# Patient Record
Sex: MALE | Race: WHITE | Employment: FULL TIME | URBAN - METROPOLITAN AREA
[De-identification: names, ages, dates, MRNs, and addresses within clinical notes are randomized per-mention and may not be internally consistent; named-entity substitution may affect disease eponyms.]

---

## 2018-01-15 ENCOUNTER — TELEPHONE (OUTPATIENT)
Dept: FAMILY MEDICINE | Facility: CLINIC | Age: 28
End: 2018-01-15

## 2020-01-06 ENCOUNTER — HOSPITAL ENCOUNTER (EMERGENCY)
Facility: CLINIC | Age: 30
Discharge: ANOTHER HOSPITAL | End: 2020-01-07
Attending: FAMILY MEDICINE | Admitting: FAMILY MEDICINE
Payer: COMMERCIAL

## 2020-01-06 ENCOUNTER — APPOINTMENT (OUTPATIENT)
Dept: NUCLEAR MEDICINE | Facility: CLINIC | Age: 30
End: 2020-01-06
Attending: FAMILY MEDICINE
Payer: COMMERCIAL

## 2020-01-06 ENCOUNTER — APPOINTMENT (OUTPATIENT)
Dept: CT IMAGING | Facility: CLINIC | Age: 30
End: 2020-01-06
Attending: FAMILY MEDICINE
Payer: COMMERCIAL

## 2020-01-06 DIAGNOSIS — G89.18 ACUTE POST-OPERATIVE PAIN: ICD-10-CM

## 2020-01-06 DIAGNOSIS — Z90.49 STATUS POST CHOLECYSTECTOMY: ICD-10-CM

## 2020-01-06 DIAGNOSIS — K83.8 BILE DUCT LEAK: ICD-10-CM

## 2020-01-06 LAB
ALBUMIN SERPL-MCNC: 4.5 G/DL (ref 3.4–5)
ALP SERPL-CCNC: 58 U/L (ref 40–150)
ALT SERPL W P-5'-P-CCNC: 42 U/L (ref 0–70)
ANION GAP SERPL CALCULATED.3IONS-SCNC: 5 MMOL/L (ref 3–14)
AST SERPL W P-5'-P-CCNC: 23 U/L (ref 0–45)
BASOPHILS # BLD AUTO: 0.1 10E9/L (ref 0–0.2)
BASOPHILS NFR BLD AUTO: 0.5 %
BILIRUB SERPL-MCNC: 0.6 MG/DL (ref 0.2–1.3)
BUN SERPL-MCNC: 14 MG/DL (ref 7–30)
CALCIUM SERPL-MCNC: 9.7 MG/DL (ref 8.5–10.1)
CHLORIDE SERPL-SCNC: 104 MMOL/L (ref 94–109)
CO2 SERPL-SCNC: 29 MMOL/L (ref 20–32)
CREAT SERPL-MCNC: 0.76 MG/DL (ref 0.66–1.25)
DIFFERENTIAL METHOD BLD: ABNORMAL
EOSINOPHIL # BLD AUTO: 0.1 10E9/L (ref 0–0.7)
EOSINOPHIL NFR BLD AUTO: 0.5 %
ERYTHROCYTE [DISTWIDTH] IN BLOOD BY AUTOMATED COUNT: 11.9 % (ref 10–15)
GFR SERPL CREATININE-BSD FRML MDRD: >90 ML/MIN/{1.73_M2}
GLUCOSE SERPL-MCNC: 98 MG/DL (ref 70–99)
HCT VFR BLD AUTO: 47 % (ref 40–53)
HGB BLD-MCNC: 16 G/DL (ref 13.3–17.7)
IMM GRANULOCYTES # BLD: 0.1 10E9/L (ref 0–0.4)
IMM GRANULOCYTES NFR BLD: 0.6 %
LACTATE BLD-SCNC: 0.8 MMOL/L (ref 0.7–2)
LIPASE SERPL-CCNC: 65 U/L (ref 73–393)
LYMPHOCYTES # BLD AUTO: 1.3 10E9/L (ref 0.8–5.3)
LYMPHOCYTES NFR BLD AUTO: 8.5 %
MCH RBC QN AUTO: 31.4 PG (ref 26.5–33)
MCHC RBC AUTO-ENTMCNC: 34 G/DL (ref 31.5–36.5)
MCV RBC AUTO: 92 FL (ref 78–100)
MONOCYTES # BLD AUTO: 0.7 10E9/L (ref 0–1.3)
MONOCYTES NFR BLD AUTO: 4.9 %
NEUTROPHILS # BLD AUTO: 12.6 10E9/L (ref 1.6–8.3)
NEUTROPHILS NFR BLD AUTO: 85 %
NRBC # BLD AUTO: 0 10*3/UL
NRBC BLD AUTO-RTO: 0 /100
PLATELET # BLD AUTO: 322 10E9/L (ref 150–450)
POTASSIUM SERPL-SCNC: 3.7 MMOL/L (ref 3.4–5.3)
PROT SERPL-MCNC: 8.4 G/DL (ref 6.8–8.8)
RBC # BLD AUTO: 5.1 10E12/L (ref 4.4–5.9)
SODIUM SERPL-SCNC: 138 MMOL/L (ref 133–144)
WBC # BLD AUTO: 14.9 10E9/L (ref 4–11)

## 2020-01-06 PROCEDURE — 78226 HEPATOBILIARY SYSTEM IMAGING: CPT

## 2020-01-06 PROCEDURE — 25000125 ZZHC RX 250: Performed by: FAMILY MEDICINE

## 2020-01-06 PROCEDURE — C9113 INJ PANTOPRAZOLE SODIUM, VIA: HCPCS | Performed by: FAMILY MEDICINE

## 2020-01-06 PROCEDURE — 74177 CT ABD & PELVIS W/CONTRAST: CPT

## 2020-01-06 PROCEDURE — 96365 THER/PROPH/DIAG IV INF INIT: CPT | Mod: 59 | Performed by: FAMILY MEDICINE

## 2020-01-06 PROCEDURE — 96376 TX/PRO/DX INJ SAME DRUG ADON: CPT | Performed by: FAMILY MEDICINE

## 2020-01-06 PROCEDURE — 25000128 H RX IP 250 OP 636: Performed by: FAMILY MEDICINE

## 2020-01-06 PROCEDURE — 86376 MICROSOMAL ANTIBODY EACH: CPT | Performed by: FAMILY MEDICINE

## 2020-01-06 PROCEDURE — 83690 ASSAY OF LIPASE: CPT | Performed by: FAMILY MEDICINE

## 2020-01-06 PROCEDURE — 96375 TX/PRO/DX INJ NEW DRUG ADDON: CPT | Performed by: FAMILY MEDICINE

## 2020-01-06 PROCEDURE — 25000132 ZZH RX MED GY IP 250 OP 250 PS 637: Performed by: FAMILY MEDICINE

## 2020-01-06 PROCEDURE — 99285 EMERGENCY DEPT VISIT HI MDM: CPT | Mod: Z6 | Performed by: FAMILY MEDICINE

## 2020-01-06 PROCEDURE — 83605 ASSAY OF LACTIC ACID: CPT | Performed by: FAMILY MEDICINE

## 2020-01-06 PROCEDURE — 99285 EMERGENCY DEPT VISIT HI MDM: CPT | Mod: 25 | Performed by: FAMILY MEDICINE

## 2020-01-06 PROCEDURE — 34300033 ZZH RX 343: Performed by: FAMILY MEDICINE

## 2020-01-06 PROCEDURE — 25800030 ZZH RX IP 258 OP 636: Performed by: FAMILY MEDICINE

## 2020-01-06 PROCEDURE — 85025 COMPLETE CBC W/AUTO DIFF WBC: CPT | Performed by: FAMILY MEDICINE

## 2020-01-06 PROCEDURE — A9537 TC99M MEBROFENIN: HCPCS | Performed by: FAMILY MEDICINE

## 2020-01-06 PROCEDURE — 25000128 H RX IP 250 OP 636

## 2020-01-06 PROCEDURE — 80053 COMPREHEN METABOLIC PANEL: CPT | Performed by: FAMILY MEDICINE

## 2020-01-06 PROCEDURE — 93005 ELECTROCARDIOGRAM TRACING: CPT | Performed by: FAMILY MEDICINE

## 2020-01-06 RX ORDER — SODIUM CHLORIDE 9 MG/ML
1000 INJECTION, SOLUTION INTRAVENOUS CONTINUOUS
Status: DISCONTINUED | OUTPATIENT
Start: 2020-01-06 | End: 2020-01-07 | Stop reason: HOSPADM

## 2020-01-06 RX ORDER — IOPAMIDOL 755 MG/ML
68 INJECTION, SOLUTION INTRAVASCULAR ONCE
Status: COMPLETED | OUTPATIENT
Start: 2020-01-06 | End: 2020-01-06

## 2020-01-06 RX ORDER — ONDANSETRON 2 MG/ML
4 INJECTION INTRAMUSCULAR; INTRAVENOUS ONCE
Status: COMPLETED | OUTPATIENT
Start: 2020-01-06 | End: 2020-01-06

## 2020-01-06 RX ORDER — SODIUM CHLORIDE, SODIUM LACTATE, POTASSIUM CHLORIDE, CALCIUM CHLORIDE 600; 310; 30; 20 MG/100ML; MG/100ML; MG/100ML; MG/100ML
1000 INJECTION, SOLUTION INTRAVENOUS CONTINUOUS
Status: DISCONTINUED | OUTPATIENT
Start: 2020-01-06 | End: 2020-01-07 | Stop reason: HOSPADM

## 2020-01-06 RX ORDER — ERTAPENEM 1 G/1
1 INJECTION, POWDER, LYOPHILIZED, FOR SOLUTION INTRAMUSCULAR; INTRAVENOUS EVERY 24 HOURS
Status: DISCONTINUED | OUTPATIENT
Start: 2020-01-06 | End: 2020-01-07 | Stop reason: HOSPADM

## 2020-01-06 RX ORDER — LEVOTHYROXINE SODIUM 88 UG/1
88 TABLET ORAL DAILY
Qty: 60 TABLET | Refills: 0 | Status: SHIPPED | OUTPATIENT
Start: 2020-01-06 | End: 2020-01-06

## 2020-01-06 RX ORDER — MORPHINE SULFATE 4 MG/ML
6 INJECTION, SOLUTION INTRAMUSCULAR; INTRAVENOUS ONCE
Status: COMPLETED | OUTPATIENT
Start: 2020-01-06 | End: 2020-01-06

## 2020-01-06 RX ORDER — OXYCODONE AND ACETAMINOPHEN 5; 325 MG/1; MG/1
1-2 TABLET ORAL EVERY 6 HOURS PRN
COMMUNITY
Start: 2019-12-31

## 2020-01-06 RX ORDER — FENTANYL CITRATE 50 UG/ML
50-100 INJECTION, SOLUTION INTRAMUSCULAR; INTRAVENOUS ONCE
Status: COMPLETED | OUTPATIENT
Start: 2020-01-06 | End: 2020-01-06

## 2020-01-06 RX ORDER — METOCLOPRAMIDE HYDROCHLORIDE 5 MG/ML
10 INJECTION INTRAMUSCULAR; INTRAVENOUS ONCE
Status: COMPLETED | OUTPATIENT
Start: 2020-01-06 | End: 2020-01-06

## 2020-01-06 RX ORDER — SUCRALFATE ORAL 1 G/10ML
1 SUSPENSION ORAL ONCE
Status: COMPLETED | OUTPATIENT
Start: 2020-01-06 | End: 2020-01-06

## 2020-01-06 RX ORDER — ESCITALOPRAM OXALATE 10 MG/1
10 TABLET ORAL DAILY
COMMUNITY
Start: 2019-12-06

## 2020-01-06 RX ORDER — KIT FOR THE PREPARATION OF TECHNETIUM TC 99M MEBROFENIN 45 MG/10ML
5.5 INJECTION, POWDER, LYOPHILIZED, FOR SOLUTION INTRAVENOUS ONCE
Status: COMPLETED | OUTPATIENT
Start: 2020-01-06 | End: 2020-01-06

## 2020-01-06 RX ORDER — HYDROMORPHONE HYDROCHLORIDE 1 MG/ML
0.5 INJECTION, SOLUTION INTRAMUSCULAR; INTRAVENOUS; SUBCUTANEOUS
Status: DISCONTINUED | OUTPATIENT
Start: 2020-01-06 | End: 2020-01-07 | Stop reason: HOSPADM

## 2020-01-06 RX ORDER — FENTANYL CITRATE 50 UG/ML
INJECTION, SOLUTION INTRAMUSCULAR; INTRAVENOUS
Status: COMPLETED
Start: 2020-01-06 | End: 2020-01-06

## 2020-01-06 RX ORDER — KETOROLAC TROMETHAMINE 15 MG/ML
15 INJECTION, SOLUTION INTRAMUSCULAR; INTRAVENOUS ONCE
Status: COMPLETED | OUTPATIENT
Start: 2020-01-06 | End: 2020-01-06

## 2020-01-06 RX ORDER — MORPHINE SULFATE 2 MG/ML
4 INJECTION, SOLUTION INTRAMUSCULAR; INTRAVENOUS
Status: DISCONTINUED | OUTPATIENT
Start: 2020-01-06 | End: 2020-01-07 | Stop reason: HOSPADM

## 2020-01-06 RX ORDER — FENTANYL CITRATE 50 UG/ML
50 INJECTION, SOLUTION INTRAMUSCULAR; INTRAVENOUS EVERY 30 MIN PRN
Status: DISCONTINUED | OUTPATIENT
Start: 2020-01-06 | End: 2020-01-07 | Stop reason: HOSPADM

## 2020-01-06 RX ADMIN — FENTANYL CITRATE 50 MCG: 50 INJECTION, SOLUTION INTRAMUSCULAR; INTRAVENOUS at 22:10

## 2020-01-06 RX ADMIN — HYDROMORPHONE HYDROCHLORIDE 0.5 MG: 1 INJECTION, SOLUTION INTRAMUSCULAR; INTRAVENOUS; SUBCUTANEOUS at 14:43

## 2020-01-06 RX ADMIN — ONDANSETRON 4 MG: 2 INJECTION INTRAMUSCULAR; INTRAVENOUS at 15:08

## 2020-01-06 RX ADMIN — HYDROMORPHONE HYDROCHLORIDE 0.5 MG: 1 INJECTION, SOLUTION INTRAMUSCULAR; INTRAVENOUS; SUBCUTANEOUS at 15:08

## 2020-01-06 RX ADMIN — SODIUM CHLORIDE 57 ML: 9 INJECTION, SOLUTION INTRAVENOUS at 16:00

## 2020-01-06 RX ADMIN — FENTANYL CITRATE 50 MCG: 50 INJECTION, SOLUTION INTRAMUSCULAR; INTRAVENOUS at 20:22

## 2020-01-06 RX ADMIN — METOCLOPRAMIDE HYDROCHLORIDE 10 MG: 5 INJECTION INTRAMUSCULAR; INTRAVENOUS at 16:53

## 2020-01-06 RX ADMIN — FENTANYL CITRATE 50 MCG: 50 INJECTION, SOLUTION INTRAMUSCULAR; INTRAVENOUS at 19:34

## 2020-01-06 RX ADMIN — ONDANSETRON 4 MG: 2 INJECTION INTRAMUSCULAR; INTRAVENOUS at 14:43

## 2020-01-06 RX ADMIN — MORPHINE SULFATE 4 MG: 2 INJECTION, SOLUTION INTRAMUSCULAR; INTRAVENOUS at 16:53

## 2020-01-06 RX ADMIN — PANTOPRAZOLE SODIUM 40 MG: 40 INJECTION, POWDER, FOR SOLUTION INTRAVENOUS at 18:23

## 2020-01-06 RX ADMIN — SODIUM CHLORIDE, POTASSIUM CHLORIDE, SODIUM LACTATE AND CALCIUM CHLORIDE 1000 ML: 600; 310; 30; 20 INJECTION, SOLUTION INTRAVENOUS at 18:24

## 2020-01-06 RX ADMIN — IOPAMIDOL 68 ML: 755 INJECTION, SOLUTION INTRAVENOUS at 15:59

## 2020-01-06 RX ADMIN — MORPHINE SULFATE 6 MG: 4 INJECTION, SOLUTION INTRAMUSCULAR; INTRAVENOUS at 15:43

## 2020-01-06 RX ADMIN — KETOROLAC TROMETHAMINE 15 MG: 15 INJECTION, SOLUTION INTRAMUSCULAR; INTRAVENOUS at 18:23

## 2020-01-06 RX ADMIN — SUCRALFATE 1 G: 1 SUSPENSION ORAL at 18:23

## 2020-01-06 RX ADMIN — ERTAPENEM SODIUM 1 G: 1 INJECTION, POWDER, LYOPHILIZED, FOR SOLUTION INTRAMUSCULAR; INTRAVENOUS at 22:10

## 2020-01-06 RX ADMIN — FENTANYL CITRATE 50 MCG: 50 INJECTION, SOLUTION INTRAMUSCULAR; INTRAVENOUS at 23:31

## 2020-01-06 RX ADMIN — SODIUM CHLORIDE 1000 ML: 9 INJECTION, SOLUTION INTRAVENOUS at 14:42

## 2020-01-06 RX ADMIN — MEBROFENIN 5.5 MILLICURIE: 45 INJECTION, POWDER, LYOPHILIZED, FOR SOLUTION INTRAVENOUS at 18:53

## 2020-01-06 ASSESSMENT — ENCOUNTER SYMPTOMS
BLOOD IN STOOL: 0
COUGH: 0
SINUS PRESSURE: 0
HEADACHES: 0
PALPITATIONS: 0
CHILLS: 0
NAUSEA: 1
CONSTIPATION: 0
VOMITING: 1
WHEEZING: 0
SHORTNESS OF BREATH: 0
ABDOMINAL PAIN: 1
FREQUENCY: 0
DIARRHEA: 0
DIAPHORESIS: 0
DYSURIA: 0
FEVER: 0
SORE THROAT: 0

## 2020-01-06 ASSESSMENT — MIFFLIN-ST. JEOR: SCORE: 1617.17

## 2020-01-06 NOTE — ED TRIAGE NOTES
Pt here for evaluation of abdominal pain and lower chest pain. Started last night ~2030 while sitting watching TV. Pain is sharp intermittent, initially upper then lower abdomen. Denies any dietary, activity, or other lifestyle changes. Lap elva at [facility] 6 days ago. Doing well up until last night. Stab surgical sites clean, dry, intact, well healing. Nausea with vomiting of bile like emesis this AM. Chills, no fever per pt report.

## 2020-01-06 NOTE — ED PROVIDER NOTES
History     Chief Complaint   Patient presents with     Post-op Problem     3 episodes of chest and abdominal pain since last night, 6 days post cholecystectomy.      HPI  Yony Zimmer is a 29 year old male who presents after lap elva 6 days ago - perfomed in [place].  recovering well until last evening, onset of pain upper abdomen pain - severe last evening and this am radiating  into the  chest - pain last for 1-2 hours - sharp pain.   bilious emesis this morning x1 episodes.  last stool scant material.  yesterday, stools were normal.  chills also but no  fever    Allergies:  Allergies   Allergen Reactions     Amoxicillin        Problem List:    Patient Active Problem List    Diagnosis Date Noted     Tobacco abuse 08/24/2015     Priority: Medium     Anxiety 12/03/2013     Priority: Medium     CARDIOVASCULAR SCREENING; LDL GOAL LESS THAN 160 11/29/2013     Priority: Medium     Acne 03/12/2012     Priority: Medium        Past Medical History:    Past Medical History:   Diagnosis Date     Migraine        Past Surgical History:    Past Surgical History:   Procedure Laterality Date     NO HISTORY OF SURGERY         Family History:    Family History   Problem Relation Age of Onset     Arthritis Father         rhuematoid arthritis     Cancer Father         GI     Heart Disease Maternal Grandfather      Cancer Paternal Grandmother         lung cancer     Neurologic Disorder Paternal Grandfather         parkinsons     Anxiety Disorder Brother        Social History:  Marital Status:  Single [1]  Social History     Tobacco Use     Smoking status: Current Every Day Smoker     Packs/day: 0.25     Years: 7.00     Pack years: 1.75     Types: Cigarettes     Smokeless tobacco: Never Used   Substance Use Topics     Alcohol use: Yes     Comment: 1-2 x week,2-3drinks     Drug use: No        Medications:    hydrOXYzine (VISTARIL) 50 MG capsule  SUMAtriptan (IMITREX) 25 MG tablet  varenicline (CHANTIX STARTING MONTH VALERIA) 0.5  "MG X 11 & 1 MG X 42 tablet  varenicline (CHANTIX) 1 MG tablet         Review of Systems   Constitutional: Negative for chills, diaphoresis and fever.   HENT: Negative for ear pain, sinus pressure and sore throat.    Eyes: Negative for visual disturbance.   Respiratory: Negative for cough, shortness of breath and wheezing.    Cardiovascular: Negative for chest pain and palpitations.   Gastrointestinal: Positive for abdominal pain, nausea and vomiting. Negative for blood in stool, constipation and diarrhea.   Genitourinary: Negative for dysuria, frequency and urgency.   Skin: Negative for rash.   Neurological: Negative for headaches.   All other systems reviewed and are negative.        Physical Exam   BP: (!) 144/83  Heart Rate: 86  Temp: 98.1  F (36.7  C)  Height: 180.3 cm (5' 11\")  Weight: 63.5 kg (140 lb)  SpO2: 99 %      Physical Exam  Constitutional:       General: He is in acute distress.      Appearance: He is ill-appearing. He is not toxic-appearing.   HENT:      Nose: Nose normal.      Mouth/Throat:      Mouth: Mucous membranes are moist.   Eyes:      Conjunctiva/sclera: Conjunctivae normal.   Neck:      Musculoskeletal: Neck supple.   Cardiovascular:      Rate and Rhythm: Normal rate and regular rhythm.      Heart sounds: No murmur. No friction rub. No gallop.    Pulmonary:      Effort: Pulmonary effort is normal. No respiratory distress.      Breath sounds: No stridor. No wheezing or rhonchi.   Abdominal:      General: Abdomen is flat. Bowel sounds are normal. There is no distension.      Palpations: There is no mass.      Tenderness: There is abdominal tenderness in the right upper quadrant and epigastric area. There is guarding. There is no right CVA tenderness, left CVA tenderness or rebound.      Hernia: No hernia is present.   Musculoskeletal: Normal range of motion.      Right lower leg: No edema.      Left lower leg: No edema.   Skin:     Coloration: Skin is not jaundiced or pale.      Findings: No " rash.   Neurological:      General: No focal deficit present.      Mental Status: He is alert.         ED Course       Procedures               Critical Care time:  none               Results for orders placed or performed during the hospital encounter of 01/06/20 (from the past 24 hour(s))   CBC with platelets, differential   Result Value Ref Range    WBC 14.9 (H) 4.0 - 11.0 10e9/L    RBC Count 5.10 4.4 - 5.9 10e12/L    Hemoglobin 16.0 13.3 - 17.7 g/dL    Hematocrit 47.0 40.0 - 53.0 %    MCV 92 78 - 100 fl    MCH 31.4 26.5 - 33.0 pg    MCHC 34.0 31.5 - 36.5 g/dL    RDW 11.9 10.0 - 15.0 %    Platelet Count 322 150 - 450 10e9/L    Diff Method Automated Method     % Neutrophils 85.0 %    % Lymphocytes 8.5 %    % Monocytes 4.9 %    % Eosinophils 0.5 %    % Basophils 0.5 %    % Immature Granulocytes 0.6 %    Nucleated RBCs 0 0 /100    Absolute Neutrophil 12.6 (H) 1.6 - 8.3 10e9/L    Absolute Lymphocytes 1.3 0.8 - 5.3 10e9/L    Absolute Monocytes 0.7 0.0 - 1.3 10e9/L    Absolute Eosinophils 0.1 0.0 - 0.7 10e9/L    Absolute Basophils 0.1 0.0 - 0.2 10e9/L    Abs Immature Granulocytes 0.1 0 - 0.4 10e9/L    Absolute Nucleated RBC 0.0    Comprehensive metabolic panel   Result Value Ref Range    Sodium 138 133 - 144 mmol/L    Potassium 3.7 3.4 - 5.3 mmol/L    Chloride 104 94 - 109 mmol/L    Carbon Dioxide 29 20 - 32 mmol/L    Anion Gap 5 3 - 14 mmol/L    Glucose 98 70 - 99 mg/dL    Urea Nitrogen 14 7 - 30 mg/dL    Creatinine 0.76 0.66 - 1.25 mg/dL    GFR Estimate >90 >60 mL/min/[1.73_m2]    GFR Estimate If Black >90 >60 mL/min/[1.73_m2]    Calcium 9.7 8.5 - 10.1 mg/dL    Bilirubin Total 0.6 0.2 - 1.3 mg/dL    Albumin 4.5 3.4 - 5.0 g/dL    Protein Total 8.4 6.8 - 8.8 g/dL    Alkaline Phosphatase 58 40 - 150 U/L    ALT 42 0 - 70 U/L    AST 23 0 - 45 U/L   Lipase   Result Value Ref Range    Lipase 65 (L) 73 - 393 U/L   CT Abdomen Pelvis w Contrast    Narrative    CT ABDOMEN AND PELVIS WITH CONTRAST   1/6/2020 4:14 PM     HISTORY:   Post-op pain status post elva.    TECHNIQUE:  68 mL Isovue 370. CT images of the abdomen and pelvis  following nonionic intravenous contrast. Radiation dose for this scan  was reduced using automated exposure control, adjustment of the mA  and/or kV according to patient size, or iterative reconstruction  technique.    COMPARISON: None.    FINDINGS: Moderate-sized hiatal hernia. The liver and spleen are  unremarkable. The gallbladder has been removed. There is some  nonloculated fluid density in the gallbladder fossa associated with  trace fluid extending down to the pelvis.    The gallbladder, adrenal glands, and kidneys are unremarkable. No  abdominal or retroperitoneal lymphadenopathy. No free intraperitoneal  air. No pelvic lymphadenopathy. Urinary bladder is unremarkable. No  lytic or blastic bone lesions. Lung bases are unremarkable.      Impression    IMPRESSION:  1. There is a small amount of nonloculated fluid density in the  gallbladder fossa with some small amount of fluid extending into the  pelvis. This is likely a small amount of residual postoperative  serosanguineous fluid. A bile leak cannot be entirely excluded. If  there are physical findings or laboratory evidence suggestive of bile  leak, consider confirmation with hepatobiliary scan.  2. Moderate-sized hiatal hernia.  3. No evidence of intra-abdominal abscess.    SAMUEL SOTO MD   Lactic acid whole blood   Result Value Ref Range    Lactic Acid 0.8 0.7 - 2.0 mmol/L   NM HepatOBiliary Scan    Narrative    NUCLEAR MEDICINE HEPATOBILIARY SCAN 1/6/2020 7:46 PM    INDICATION: Postoperative cholecystectomy, evaluate for bile leak.     COMPARISON: CT abdomen pelvis on same day earlier.    TECHNIQUE: The patient received 5.5 mCi Tc-Mebrofenin intravenously.  Images were obtained out through 70 minutes.     FINDINGS: Small amount of radiotracer appear to track along the  anterior and posterior aspects of the liver on delayed images,  finding  suggestive of bile leak.      Impression    IMPRESSION: Small amount of radiotracer noted tracking along the  anterior and posterior aspects of the liver, findings suggestive of  bile leak.    LEO JOEL MD       Medications   0.9% sodium chloride BOLUS (0 mLs Intravenous Stopped 1/6/20 1745)   ondansetron (ZOFRAN) injection 4 mg (4 mg Intravenous Given 1/6/20 1443)   ondansetron (ZOFRAN) injection 4 mg (4 mg Intravenous Given 1/6/20 1508)   iopamidol (ISOVUE-370) solution 68 mL (68 mLs Intravenous Given 1/6/20 1559)   sodium chloride 0.9 % bag 500mL for CT scan flush use (57 mLs Intravenous Given 1/6/20 1600)   morphine (PF) injection 6 mg (6 mg Intravenous Given 1/6/20 1543)   metoclopramide (REGLAN) injection 10 mg (10 mg Intravenous Given 1/6/20 1653)   pantoprazole (PROTONIX) 40 mg IV push injection (40 mg Intravenous Given 1/6/20 1823)   sucralfate (CARAFATE) suspension 1 g (1 g Oral Given 1/6/20 1823)   lactated ringers BOLUS 1,000 mL ( Intravenous Restarted 1/6/20 2022)   ketorolac (TORADOL) injection 15 mg (15 mg Intravenous Given 1/6/20 1823)   technetium Tc 99m mebrofenin (CHOLETEC) radioisotope injection 5.5 millicurie (5.5 millicuries Intravenous Given 1/6/20 1853)   fentaNYL (PF) (SUBLIMAZE) injection  mcg (50 mcg Intravenous Given 1/6/20 2022)       Assessments & Plan (with Medical Decision Making)     MDM: Yony Zimmer is a 29 year old male who presents with epigastric and right upper quadrant pain that had onset 6 days after cholecystectomy performed at [facility] and has been recovering locally with his family in [place] since surgery.  This is been accompanied by an episode of vomiting as well as nausea but particularly of significantly increased pain in bouts of 2 hours of pain with some relief in between.  He has been using oral analgesics at home without benefit and presented after almost 24 hours of symptoms.    Here he is afebrile with normal blood  pressure heart rate, but he is in significant distress on his presentation and his abdomen is tender with guarding in the upper quadrants.  I discussed his findings with Dr. Cruz who recommended that if his bilirubin were elevated to go directly to HIDA scan but if normal to start with a CT of the abdomen and pelvis.  The CT was nondiagnostic and could not exclude biliary leak and therefore HIDA scan was obtained which did identify biliary leak.   Discussed with Dr. Fabian in [facility] and Dr. Palacios hospitalist and they accept the patient for transfer.  Discussed transfer with Ruddy and he agrees to transfer.      Of note is that the patient's symptoms were quite difficult to get under control with pain management in the emergency department.  He had increased pain with both Dilaudid and with morphine.  He ultimately had improved pain relief with fentanyl and that was continued.  His nausea was not completely controlled with Zofran and then following CT demonstrating no small bowel obstruction, with Reglan.    He was administered IV fluids and then after discussion with Dr. Fabian, ertapenem.      I did call lab on 1/7/2020 am to let them know I had accidentally placed a TPO thyroid antibody on this patient and asked them to delete the order.      I have reviewed the nursing notes.    I have reviewed the findings, diagnosis, plan and need for follow up with the patient.       New Prescriptions    No medications on file       Final diagnoses:   Bile duct leak   Acute post-operative pain   Status post cholecystectomy       1/6/2020   [facility] EMERGENCY DEPARTMENT     Clayton Allen MD  01/07/20 7450

## 2020-01-06 NOTE — ED NOTES
Pt reports worsening pain and nausea. Pt is diaphoretic and belching in room. Given additional medications per MD

## 2020-01-07 ENCOUNTER — APPOINTMENT (OUTPATIENT)
Dept: GENERAL RADIOLOGY | Facility: CLINIC | Age: 30
End: 2020-01-07
Attending: INTERNAL MEDICINE
Payer: COMMERCIAL

## 2020-01-07 ENCOUNTER — ANESTHESIA (OUTPATIENT)
Dept: SURGERY | Facility: CLINIC | Age: 30
End: 2020-01-07
Payer: COMMERCIAL

## 2020-01-07 ENCOUNTER — ANESTHESIA EVENT (OUTPATIENT)
Dept: SURGERY | Facility: CLINIC | Age: 30
End: 2020-01-07
Payer: COMMERCIAL

## 2020-01-07 ENCOUNTER — HOSPITAL ENCOUNTER (INPATIENT)
Facility: CLINIC | Age: 30
LOS: 2 days | Discharge: HOME | End: 2020-01-09
Attending: INTERNAL MEDICINE | Admitting: INTERNAL MEDICINE
Payer: COMMERCIAL

## 2020-01-07 VITALS
BODY MASS INDEX: 19.6 KG/M2 | HEART RATE: 91 BPM | WEIGHT: 140 LBS | SYSTOLIC BLOOD PRESSURE: 127 MMHG | DIASTOLIC BLOOD PRESSURE: 73 MMHG | RESPIRATION RATE: 13 BRPM | TEMPERATURE: 98.1 F | OXYGEN SATURATION: 100 % | HEIGHT: 71 IN

## 2020-01-07 DIAGNOSIS — K83.9 BILE LEAK: ICD-10-CM

## 2020-01-07 DIAGNOSIS — R10.84 GENERALIZED ABDOMINAL PAIN: ICD-10-CM

## 2020-01-07 DIAGNOSIS — R11.0 NAUSEA: ICD-10-CM

## 2020-01-07 DIAGNOSIS — K83.9 BILE LEAK: Primary | ICD-10-CM

## 2020-01-07 PROBLEM — R10.9 ABDOMINAL PAIN: Status: ACTIVE | Noted: 2020-01-07

## 2020-01-07 LAB
ALBUMIN SERPL-MCNC: 3.4 G/DL (ref 3.4–5)
ALP SERPL-CCNC: 48 U/L (ref 40–150)
ALT SERPL W P-5'-P-CCNC: 30 U/L (ref 0–70)
AMYLASE SERPL-CCNC: 64 U/L (ref 30–110)
ANION GAP SERPL CALCULATED.3IONS-SCNC: 4 MMOL/L (ref 3–14)
AST SERPL W P-5'-P-CCNC: 18 U/L (ref 0–45)
BASOPHILS # BLD AUTO: 0 10E9/L (ref 0–0.2)
BASOPHILS NFR BLD AUTO: 0.3 %
BILIRUB SERPL-MCNC: 1.1 MG/DL (ref 0.2–1.3)
BUN SERPL-MCNC: 13 MG/DL (ref 7–30)
CALCIUM SERPL-MCNC: 8.7 MG/DL (ref 8.5–10.1)
CHLORIDE SERPL-SCNC: 105 MMOL/L (ref 94–109)
CO2 SERPL-SCNC: 28 MMOL/L (ref 20–32)
CREAT SERPL-MCNC: 0.73 MG/DL (ref 0.66–1.25)
DIFFERENTIAL METHOD BLD: ABNORMAL
EOSINOPHIL # BLD AUTO: 0.1 10E9/L (ref 0–0.7)
EOSINOPHIL NFR BLD AUTO: 1 %
ERCP: NORMAL
ERYTHROCYTE [DISTWIDTH] IN BLOOD BY AUTOMATED COUNT: 11.9 % (ref 10–15)
ERYTHROCYTE [DISTWIDTH] IN BLOOD BY AUTOMATED COUNT: 12 % (ref 10–15)
GFR SERPL CREATININE-BSD FRML MDRD: >90 ML/MIN/{1.73_M2}
GLUCOSE BLDC GLUCOMTR-MCNC: 92 MG/DL (ref 70–99)
GLUCOSE SERPL-MCNC: 95 MG/DL (ref 70–99)
HCT VFR BLD AUTO: 38.7 % (ref 40–53)
HCT VFR BLD AUTO: 42 % (ref 40–53)
HGB BLD-MCNC: 12.8 G/DL (ref 13.3–17.7)
HGB BLD-MCNC: 13.8 G/DL (ref 13.3–17.7)
IMM GRANULOCYTES # BLD: 0.1 10E9/L (ref 0–0.4)
IMM GRANULOCYTES NFR BLD: 0.4 %
INR PPP: 1.05 (ref 0.86–1.14)
LIPASE SERPL-CCNC: 393 U/L (ref 73–393)
LYMPHOCYTES # BLD AUTO: 1.6 10E9/L (ref 0.8–5.3)
LYMPHOCYTES NFR BLD AUTO: 13 %
MCH RBC QN AUTO: 31.1 PG (ref 26.5–33)
MCH RBC QN AUTO: 31.1 PG (ref 26.5–33)
MCHC RBC AUTO-ENTMCNC: 32.9 G/DL (ref 31.5–36.5)
MCHC RBC AUTO-ENTMCNC: 33.1 G/DL (ref 31.5–36.5)
MCV RBC AUTO: 94 FL (ref 78–100)
MCV RBC AUTO: 95 FL (ref 78–100)
MONOCYTES # BLD AUTO: 1.2 10E9/L (ref 0–1.3)
MONOCYTES NFR BLD AUTO: 9.5 %
NEUTROPHILS # BLD AUTO: 9.4 10E9/L (ref 1.6–8.3)
NEUTROPHILS NFR BLD AUTO: 75.8 %
NRBC # BLD AUTO: 0 10*3/UL
NRBC BLD AUTO-RTO: 0 /100
PLATELET # BLD AUTO: 245 10E9/L (ref 150–450)
PLATELET # BLD AUTO: 249 10E9/L (ref 150–450)
POTASSIUM SERPL-SCNC: 3.7 MMOL/L (ref 3.4–5.3)
PROT SERPL-MCNC: 6.5 G/DL (ref 6.8–8.8)
RBC # BLD AUTO: 4.12 10E12/L (ref 4.4–5.9)
RBC # BLD AUTO: 4.44 10E12/L (ref 4.4–5.9)
SODIUM SERPL-SCNC: 137 MMOL/L (ref 133–144)
WBC # BLD AUTO: 12.4 10E9/L (ref 4–11)
WBC # BLD AUTO: 12.5 10E9/L (ref 4–11)

## 2020-01-07 PROCEDURE — 36415 COLL VENOUS BLD VENIPUNCTURE: CPT | Performed by: INTERNAL MEDICINE

## 2020-01-07 PROCEDURE — 25000125 ZZHC RX 250: Performed by: INTERNAL MEDICINE

## 2020-01-07 PROCEDURE — 82150 ASSAY OF AMYLASE: CPT | Performed by: INTERNAL MEDICINE

## 2020-01-07 PROCEDURE — 25000128 H RX IP 250 OP 636: Performed by: NURSE ANESTHETIST, CERTIFIED REGISTERED

## 2020-01-07 PROCEDURE — 85025 COMPLETE CBC W/AUTO DIFF WBC: CPT | Performed by: STUDENT IN AN ORGANIZED HEALTH CARE EDUCATION/TRAINING PROGRAM

## 2020-01-07 PROCEDURE — 36415 COLL VENOUS BLD VENIPUNCTURE: CPT | Performed by: STUDENT IN AN ORGANIZED HEALTH CARE EDUCATION/TRAINING PROGRAM

## 2020-01-07 PROCEDURE — 40000279 XR SURGERY CARM FLUORO GREATER THAN 5 MIN W STILLS: Mod: TC

## 2020-01-07 PROCEDURE — 00000146 ZZHCL STATISTIC GLUCOSE BY METER IP

## 2020-01-07 PROCEDURE — 25000132 ZZH RX MED GY IP 250 OP 250 PS 637: Performed by: PATHOLOGY

## 2020-01-07 PROCEDURE — 12000001 ZZH R&B MED SURG/OB UMMC

## 2020-01-07 PROCEDURE — C1769 GUIDE WIRE: HCPCS | Performed by: INTERNAL MEDICINE

## 2020-01-07 PROCEDURE — 25000128 H RX IP 250 OP 636: Performed by: FAMILY MEDICINE

## 2020-01-07 PROCEDURE — 25800030 ZZH RX IP 258 OP 636: Performed by: NURSE ANESTHETIST, CERTIFIED REGISTERED

## 2020-01-07 PROCEDURE — 27210794 ZZH OR GENERAL SUPPLY STERILE: Performed by: INTERNAL MEDICINE

## 2020-01-07 PROCEDURE — 25000128 H RX IP 250 OP 636: Performed by: STUDENT IN AN ORGANIZED HEALTH CARE EDUCATION/TRAINING PROGRAM

## 2020-01-07 PROCEDURE — 40000170 ZZH STATISTIC PRE-PROCEDURE ASSESSMENT II: Performed by: INTERNAL MEDICINE

## 2020-01-07 PROCEDURE — 25800030 ZZH RX IP 258 OP 636: Performed by: STUDENT IN AN ORGANIZED HEALTH CARE EDUCATION/TRAINING PROGRAM

## 2020-01-07 PROCEDURE — 36000059 ZZH SURGERY LEVEL 3 EA 15 ADDTL MIN UMMC: Performed by: INTERNAL MEDICINE

## 2020-01-07 PROCEDURE — 96376 TX/PRO/DX INJ SAME DRUG ADON: CPT | Performed by: FAMILY MEDICINE

## 2020-01-07 PROCEDURE — 25000125 ZZHC RX 250: Performed by: NURSE ANESTHETIST, CERTIFIED REGISTERED

## 2020-01-07 PROCEDURE — 80053 COMPREHEN METABOLIC PANEL: CPT | Performed by: STUDENT IN AN ORGANIZED HEALTH CARE EDUCATION/TRAINING PROGRAM

## 2020-01-07 PROCEDURE — 36000057 ZZH SURGERY LEVEL 3 1ST 30 MIN - UMMC: Performed by: INTERNAL MEDICINE

## 2020-01-07 PROCEDURE — 37000009 ZZH ANESTHESIA TECHNICAL FEE, EACH ADDTL 15 MIN: Performed by: INTERNAL MEDICINE

## 2020-01-07 PROCEDURE — 71000012 ZZH RECOVERY PHASE 1 LEVEL 1 FIRST HR: Performed by: INTERNAL MEDICINE

## 2020-01-07 PROCEDURE — C1877 STENT, NON-COAT/COV W/O DEL: HCPCS | Performed by: INTERNAL MEDICINE

## 2020-01-07 PROCEDURE — 25000132 ZZH RX MED GY IP 250 OP 250 PS 637: Performed by: STUDENT IN AN ORGANIZED HEALTH CARE EDUCATION/TRAINING PROGRAM

## 2020-01-07 PROCEDURE — 85610 PROTHROMBIN TIME: CPT | Performed by: STUDENT IN AN ORGANIZED HEALTH CARE EDUCATION/TRAINING PROGRAM

## 2020-01-07 PROCEDURE — 25000565 ZZH ISOFLURANE, EA 15 MIN: Performed by: INTERNAL MEDICINE

## 2020-01-07 PROCEDURE — 87040 BLOOD CULTURE FOR BACTERIA: CPT | Performed by: STUDENT IN AN ORGANIZED HEALTH CARE EDUCATION/TRAINING PROGRAM

## 2020-01-07 PROCEDURE — 85027 COMPLETE CBC AUTOMATED: CPT | Performed by: STUDENT IN AN ORGANIZED HEALTH CARE EDUCATION/TRAINING PROGRAM

## 2020-01-07 PROCEDURE — 37000008 ZZH ANESTHESIA TECHNICAL FEE, 1ST 30 MIN: Performed by: INTERNAL MEDICINE

## 2020-01-07 PROCEDURE — 0F798DZ DILATION OF COMMON BILE DUCT WITH INTRALUMINAL DEVICE, VIA NATURAL OR ARTIFICIAL OPENING ENDOSCOPIC: ICD-10-PCS | Performed by: INTERNAL MEDICINE

## 2020-01-07 PROCEDURE — 25500064 ZZH RX 255 OP 636: Performed by: INTERNAL MEDICINE

## 2020-01-07 PROCEDURE — 25000125 ZZHC RX 250: Performed by: ANESTHESIOLOGY

## 2020-01-07 PROCEDURE — 83690 ASSAY OF LIPASE: CPT | Performed by: INTERNAL MEDICINE

## 2020-01-07 DEVICE — STENT ZIMMON PANCREA 7FRX09CM SGL PIGTAIL G22456
Type: IMPLANTABLE DEVICE | Site: BILE DUCT | Status: NON-FUNCTIONAL
Removed: 2020-02-26

## 2020-01-07 DEVICE — STENT FREEMAN PANCREA FLEX 4FRX11CM W/O FLANGE SGL PIGTAIL
Type: IMPLANTABLE DEVICE | Site: PANCREATIC DUCT | Status: NON-FUNCTIONAL
Removed: 2020-02-26

## 2020-01-07 RX ORDER — DEXAMETHASONE SODIUM PHOSPHATE 4 MG/ML
INJECTION, SOLUTION INTRA-ARTICULAR; INTRALESIONAL; INTRAMUSCULAR; INTRAVENOUS; SOFT TISSUE PRN
Status: DISCONTINUED | OUTPATIENT
Start: 2020-01-07 | End: 2020-01-07

## 2020-01-07 RX ORDER — NICOTINE 21 MG/24HR
1 PATCH, TRANSDERMAL 24 HOURS TRANSDERMAL DAILY
Status: DISCONTINUED | OUTPATIENT
Start: 2020-01-07 | End: 2020-01-09 | Stop reason: HOSPADM

## 2020-01-07 RX ORDER — MEPERIDINE HYDROCHLORIDE 25 MG/ML
12.5 INJECTION INTRAMUSCULAR; INTRAVENOUS; SUBCUTANEOUS
Status: DISCONTINUED | OUTPATIENT
Start: 2020-01-07 | End: 2020-01-07 | Stop reason: HOSPADM

## 2020-01-07 RX ORDER — IOPAMIDOL 510 MG/ML
INJECTION, SOLUTION INTRAVASCULAR PRN
Status: DISCONTINUED | OUTPATIENT
Start: 2020-01-07 | End: 2020-01-07 | Stop reason: HOSPADM

## 2020-01-07 RX ORDER — HYDROMORPHONE HYDROCHLORIDE 1 MG/ML
.3-.5 INJECTION, SOLUTION INTRAMUSCULAR; INTRAVENOUS; SUBCUTANEOUS
Status: DISCONTINUED | OUTPATIENT
Start: 2020-01-07 | End: 2020-01-07

## 2020-01-07 RX ORDER — ONDANSETRON 4 MG/1
4 TABLET, ORALLY DISINTEGRATING ORAL EVERY 6 HOURS PRN
Status: DISCONTINUED | OUTPATIENT
Start: 2020-01-07 | End: 2020-01-09 | Stop reason: HOSPADM

## 2020-01-07 RX ORDER — NALOXONE HYDROCHLORIDE 0.4 MG/ML
.1-.4 INJECTION, SOLUTION INTRAMUSCULAR; INTRAVENOUS; SUBCUTANEOUS
Status: DISCONTINUED | OUTPATIENT
Start: 2020-01-07 | End: 2020-01-07 | Stop reason: HOSPADM

## 2020-01-07 RX ORDER — PIPERACILLIN SODIUM, TAZOBACTAM SODIUM 3; .375 G/15ML; G/15ML
3.38 INJECTION, POWDER, LYOPHILIZED, FOR SOLUTION INTRAVENOUS EVERY 6 HOURS
Status: DISCONTINUED | OUTPATIENT
Start: 2020-01-08 | End: 2020-01-07

## 2020-01-07 RX ORDER — IPRATROPIUM BROMIDE AND ALBUTEROL SULFATE 2.5; .5 MG/3ML; MG/3ML
3 SOLUTION RESPIRATORY (INHALATION)
Status: COMPLETED | OUTPATIENT
Start: 2020-01-07 | End: 2020-01-07

## 2020-01-07 RX ORDER — FENTANYL CITRATE 50 UG/ML
INJECTION, SOLUTION INTRAMUSCULAR; INTRAVENOUS PRN
Status: DISCONTINUED | OUTPATIENT
Start: 2020-01-07 | End: 2020-01-07

## 2020-01-07 RX ORDER — HYDROMORPHONE HYDROCHLORIDE 1 MG/ML
.3-.5 INJECTION, SOLUTION INTRAMUSCULAR; INTRAVENOUS; SUBCUTANEOUS EVERY 10 MIN PRN
Status: DISCONTINUED | OUTPATIENT
Start: 2020-01-07 | End: 2020-01-07 | Stop reason: HOSPADM

## 2020-01-07 RX ORDER — SODIUM CHLORIDE, SODIUM LACTATE, POTASSIUM CHLORIDE, CALCIUM CHLORIDE 600; 310; 30; 20 MG/100ML; MG/100ML; MG/100ML; MG/100ML
INJECTION, SOLUTION INTRAVENOUS CONTINUOUS
Status: DISCONTINUED | OUTPATIENT
Start: 2020-01-07 | End: 2020-01-07 | Stop reason: HOSPADM

## 2020-01-07 RX ORDER — ONDANSETRON 2 MG/ML
4 INJECTION INTRAMUSCULAR; INTRAVENOUS EVERY 6 HOURS PRN
Status: DISCONTINUED | OUTPATIENT
Start: 2020-01-07 | End: 2020-01-09 | Stop reason: HOSPADM

## 2020-01-07 RX ORDER — SODIUM CHLORIDE, SODIUM LACTATE, POTASSIUM CHLORIDE, CALCIUM CHLORIDE 600; 310; 30; 20 MG/100ML; MG/100ML; MG/100ML; MG/100ML
INJECTION, SOLUTION INTRAVENOUS CONTINUOUS
Status: DISCONTINUED | OUTPATIENT
Start: 2020-01-08 | End: 2020-01-07

## 2020-01-07 RX ORDER — LIDOCAINE HYDROCHLORIDE 20 MG/ML
INJECTION, SOLUTION INFILTRATION; PERINEURAL PRN
Status: DISCONTINUED | OUTPATIENT
Start: 2020-01-07 | End: 2020-01-07

## 2020-01-07 RX ORDER — HYDROMORPHONE HYDROCHLORIDE 1 MG/ML
.5-1 INJECTION, SOLUTION INTRAMUSCULAR; INTRAVENOUS; SUBCUTANEOUS
Status: DISCONTINUED | OUTPATIENT
Start: 2020-01-07 | End: 2020-01-08

## 2020-01-07 RX ORDER — ACETAMINOPHEN 325 MG/1
975 TABLET ORAL EVERY 8 HOURS
Status: DISCONTINUED | OUTPATIENT
Start: 2020-01-07 | End: 2020-01-09 | Stop reason: HOSPADM

## 2020-01-07 RX ORDER — POLYETHYLENE GLYCOL 3350 17 G/17G
17 POWDER, FOR SOLUTION ORAL DAILY PRN
Status: DISCONTINUED | OUTPATIENT
Start: 2020-01-07 | End: 2020-01-09 | Stop reason: HOSPADM

## 2020-01-07 RX ORDER — NALOXONE HYDROCHLORIDE 0.4 MG/ML
.1-.4 INJECTION, SOLUTION INTRAMUSCULAR; INTRAVENOUS; SUBCUTANEOUS
Status: DISCONTINUED | OUTPATIENT
Start: 2020-01-07 | End: 2020-01-09 | Stop reason: HOSPADM

## 2020-01-07 RX ORDER — FENTANYL CITRATE 50 UG/ML
25-50 INJECTION, SOLUTION INTRAMUSCULAR; INTRAVENOUS
Status: DISCONTINUED | OUTPATIENT
Start: 2020-01-07 | End: 2020-01-07 | Stop reason: HOSPADM

## 2020-01-07 RX ORDER — PROPOFOL 10 MG/ML
INJECTION, EMULSION INTRAVENOUS PRN
Status: DISCONTINUED | OUTPATIENT
Start: 2020-01-07 | End: 2020-01-07

## 2020-01-07 RX ORDER — SODIUM CHLORIDE, SODIUM LACTATE, POTASSIUM CHLORIDE, CALCIUM CHLORIDE 600; 310; 30; 20 MG/100ML; MG/100ML; MG/100ML; MG/100ML
INJECTION, SOLUTION INTRAVENOUS CONTINUOUS PRN
Status: DISCONTINUED | OUTPATIENT
Start: 2020-01-07 | End: 2020-01-07

## 2020-01-07 RX ORDER — POLYETHYLENE GLYCOL 3350 17 G/17G
17 POWDER, FOR SOLUTION ORAL DAILY PRN
Status: DISCONTINUED | OUTPATIENT
Start: 2020-01-07 | End: 2020-01-07

## 2020-01-07 RX ORDER — BISACODYL 10 MG
10 SUPPOSITORY, RECTAL RECTAL DAILY PRN
Status: DISCONTINUED | OUTPATIENT
Start: 2020-01-07 | End: 2020-01-09 | Stop reason: HOSPADM

## 2020-01-07 RX ORDER — ONDANSETRON 2 MG/ML
4 INJECTION INTRAMUSCULAR; INTRAVENOUS EVERY 30 MIN PRN
Status: DISCONTINUED | OUTPATIENT
Start: 2020-01-07 | End: 2020-01-07 | Stop reason: HOSPADM

## 2020-01-07 RX ORDER — ONDANSETRON 4 MG/1
4 TABLET, ORALLY DISINTEGRATING ORAL EVERY 30 MIN PRN
Status: DISCONTINUED | OUTPATIENT
Start: 2020-01-07 | End: 2020-01-07 | Stop reason: HOSPADM

## 2020-01-07 RX ORDER — ERTAPENEM 1 G/1
1 INJECTION, POWDER, LYOPHILIZED, FOR SOLUTION INTRAMUSCULAR; INTRAVENOUS EVERY 24 HOURS
Status: DISCONTINUED | OUTPATIENT
Start: 2020-01-07 | End: 2020-01-08

## 2020-01-07 RX ORDER — PIPERACILLIN SODIUM, TAZOBACTAM SODIUM 3; .375 G/15ML; G/15ML
3.38 INJECTION, POWDER, LYOPHILIZED, FOR SOLUTION INTRAVENOUS EVERY 6 HOURS
Status: DISCONTINUED | OUTPATIENT
Start: 2020-01-07 | End: 2020-01-07

## 2020-01-07 RX ORDER — POLYETHYLENE GLYCOL 3350 17 G/17G
17 POWDER, FOR SOLUTION ORAL DAILY
Status: DISCONTINUED | OUTPATIENT
Start: 2020-01-07 | End: 2020-01-08

## 2020-01-07 RX ORDER — DIMENHYDRINATE 50 MG/ML
25 INJECTION, SOLUTION INTRAMUSCULAR; INTRAVENOUS
Status: DISCONTINUED | OUTPATIENT
Start: 2020-01-07 | End: 2020-01-07 | Stop reason: HOSPADM

## 2020-01-07 RX ORDER — ONDANSETRON 2 MG/ML
INJECTION INTRAMUSCULAR; INTRAVENOUS PRN
Status: DISCONTINUED | OUTPATIENT
Start: 2020-01-07 | End: 2020-01-07

## 2020-01-07 RX ORDER — BISACODYL 5 MG
5 TABLET, DELAYED RELEASE (ENTERIC COATED) ORAL DAILY PRN
Status: DISCONTINUED | OUTPATIENT
Start: 2020-01-07 | End: 2020-01-09 | Stop reason: HOSPADM

## 2020-01-07 RX ORDER — ALBUTEROL SULFATE 0.83 MG/ML
2.5 SOLUTION RESPIRATORY (INHALATION) EVERY 4 HOURS PRN
Status: DISCONTINUED | OUTPATIENT
Start: 2020-01-07 | End: 2020-01-07 | Stop reason: HOSPADM

## 2020-01-07 RX ORDER — BISACODYL 5 MG
15 TABLET, DELAYED RELEASE (ENTERIC COATED) ORAL DAILY PRN
Status: DISCONTINUED | OUTPATIENT
Start: 2020-01-07 | End: 2020-01-09 | Stop reason: HOSPADM

## 2020-01-07 RX ORDER — METOPROLOL TARTRATE 1 MG/ML
1-2 INJECTION, SOLUTION INTRAVENOUS EVERY 5 MIN PRN
Status: DISCONTINUED | OUTPATIENT
Start: 2020-01-07 | End: 2020-01-07 | Stop reason: HOSPADM

## 2020-01-07 RX ORDER — HYDRALAZINE HYDROCHLORIDE 20 MG/ML
2.5-5 INJECTION INTRAMUSCULAR; INTRAVENOUS EVERY 10 MIN PRN
Status: DISCONTINUED | OUTPATIENT
Start: 2020-01-07 | End: 2020-01-07 | Stop reason: HOSPADM

## 2020-01-07 RX ORDER — BISACODYL 5 MG
10 TABLET, DELAYED RELEASE (ENTERIC COATED) ORAL DAILY PRN
Status: DISCONTINUED | OUTPATIENT
Start: 2020-01-07 | End: 2020-01-09 | Stop reason: HOSPADM

## 2020-01-07 RX ORDER — LIDOCAINE 40 MG/G
CREAM TOPICAL
Status: DISCONTINUED | OUTPATIENT
Start: 2020-01-07 | End: 2020-01-09 | Stop reason: HOSPADM

## 2020-01-07 RX ADMIN — ACETAMINOPHEN 975 MG: 325 TABLET, FILM COATED ORAL at 09:40

## 2020-01-07 RX ADMIN — HYDROMORPHONE HYDROCHLORIDE 1 MG: 1 INJECTION, SOLUTION INTRAMUSCULAR; INTRAVENOUS; SUBCUTANEOUS at 19:55

## 2020-01-07 RX ADMIN — NICOTINE 1 PATCH: 14 PATCH, EXTENDED RELEASE TRANSDERMAL at 19:49

## 2020-01-07 RX ADMIN — SODIUM CHLORIDE, POTASSIUM CHLORIDE, SODIUM LACTATE AND CALCIUM CHLORIDE: 600; 310; 30; 20 INJECTION, SOLUTION INTRAVENOUS at 12:42

## 2020-01-07 RX ADMIN — MIDAZOLAM 2 MG: 1 INJECTION INTRAMUSCULAR; INTRAVENOUS at 12:49

## 2020-01-07 RX ADMIN — IPRATROPIUM BROMIDE AND ALBUTEROL SULFATE 3 ML: .5; 3 SOLUTION RESPIRATORY (INHALATION) at 11:08

## 2020-01-07 RX ADMIN — HYDROMORPHONE HYDROCHLORIDE 0.5 MG: 1 INJECTION, SOLUTION INTRAMUSCULAR; INTRAVENOUS; SUBCUTANEOUS at 02:19

## 2020-01-07 RX ADMIN — ONDANSETRON 4 MG: 4 TABLET, ORALLY DISINTEGRATING ORAL at 22:11

## 2020-01-07 RX ADMIN — HYDROMORPHONE HYDROCHLORIDE 1 MG: 1 INJECTION, SOLUTION INTRAMUSCULAR; INTRAVENOUS; SUBCUTANEOUS at 08:53

## 2020-01-07 RX ADMIN — HYDROMORPHONE HYDROCHLORIDE 1 MG: 1 INJECTION, SOLUTION INTRAMUSCULAR; INTRAVENOUS; SUBCUTANEOUS at 17:36

## 2020-01-07 RX ADMIN — HYDROMORPHONE HYDROCHLORIDE 0.5 MG: 1 INJECTION, SOLUTION INTRAMUSCULAR; INTRAVENOUS; SUBCUTANEOUS at 04:27

## 2020-01-07 RX ADMIN — DEXAMETHASONE SODIUM PHOSPHATE 6 MG: 4 INJECTION, SOLUTION INTRA-ARTICULAR; INTRALESIONAL; INTRAMUSCULAR; INTRAVENOUS; SOFT TISSUE at 13:30

## 2020-01-07 RX ADMIN — ERTAPENEM SODIUM 1 G: 1 INJECTION, POWDER, LYOPHILIZED, FOR SOLUTION INTRAMUSCULAR; INTRAVENOUS at 21:59

## 2020-01-07 RX ADMIN — LIDOCAINE HYDROCHLORIDE 100 MG: 20 INJECTION, SOLUTION INFILTRATION; PERINEURAL at 13:01

## 2020-01-07 RX ADMIN — FENTANYL CITRATE 100 MCG: 50 INJECTION, SOLUTION INTRAMUSCULAR; INTRAVENOUS at 13:01

## 2020-01-07 RX ADMIN — SODIUM CHLORIDE, POTASSIUM CHLORIDE, SODIUM LACTATE AND CALCIUM CHLORIDE 1000 ML: 600; 310; 30; 20 INJECTION, SOLUTION INTRAVENOUS at 02:11

## 2020-01-07 RX ADMIN — PROPOFOL 140 MG: 10 INJECTION, EMULSION INTRAVENOUS at 13:02

## 2020-01-07 RX ADMIN — ONDANSETRON 4 MG: 2 INJECTION INTRAMUSCULAR; INTRAVENOUS at 13:42

## 2020-01-07 RX ADMIN — ROCURONIUM BROMIDE 10 MG: 10 INJECTION INTRAVENOUS at 14:07

## 2020-01-07 RX ADMIN — HYDROMORPHONE HYDROCHLORIDE 1 MG: 1 INJECTION, SOLUTION INTRAMUSCULAR; INTRAVENOUS; SUBCUTANEOUS at 22:02

## 2020-01-07 RX ADMIN — FENTANYL CITRATE 75 MCG: 50 INJECTION, SOLUTION INTRAMUSCULAR; INTRAVENOUS at 15:07

## 2020-01-07 RX ADMIN — SUGAMMADEX 130 MG: 100 INJECTION, SOLUTION INTRAVENOUS at 14:59

## 2020-01-07 RX ADMIN — ROCURONIUM BROMIDE 50 MG: 10 INJECTION INTRAVENOUS at 13:02

## 2020-01-07 RX ADMIN — FENTANYL CITRATE 50 MCG: 50 INJECTION, SOLUTION INTRAMUSCULAR; INTRAVENOUS at 00:20

## 2020-01-07 RX ADMIN — FENTANYL CITRATE 25 MCG: 50 INJECTION, SOLUTION INTRAMUSCULAR; INTRAVENOUS at 14:49

## 2020-01-07 RX ADMIN — SODIUM CHLORIDE, POTASSIUM CHLORIDE, SODIUM LACTATE AND CALCIUM CHLORIDE: 600; 310; 30; 20 INJECTION, SOLUTION INTRAVENOUS at 14:42

## 2020-01-07 RX ADMIN — MIDAZOLAM 1 MG: 1 INJECTION INTRAMUSCULAR; INTRAVENOUS at 12:56

## 2020-01-07 RX ADMIN — FENTANYL CITRATE 50 MCG: 50 INJECTION, SOLUTION INTRAMUSCULAR; INTRAVENOUS at 12:55

## 2020-01-07 ASSESSMENT — ACTIVITIES OF DAILY LIVING (ADL)
AMBULATION: 0-->INDEPENDENT
BATHING: 0-->INDEPENDENT
ADLS_ACUITY_SCORE: 10
ADLS_ACUITY_SCORE: 10
RETIRED_COMMUNICATION: 0-->UNDERSTANDS/COMMUNICATES WITHOUT DIFFICULTY
ADLS_ACUITY_SCORE: 10
DRESS: 0-->INDEPENDENT
TOILETING: 0-->INDEPENDENT
SWALLOWING: 0-->SWALLOWS FOODS/LIQUIDS WITHOUT DIFFICULTY
FALL_HISTORY_WITHIN_LAST_SIX_MONTHS: NO
COGNITION: 0 - NO COGNITION ISSUES REPORTED
RETIRED_EATING: 0-->INDEPENDENT
TRANSFERRING: 0-->INDEPENDENT

## 2020-01-07 ASSESSMENT — MIFFLIN-ST. JEOR: SCORE: 1653.13

## 2020-01-07 ASSESSMENT — LIFESTYLE VARIABLES: TOBACCO_USE: 1

## 2020-01-07 NOTE — OP NOTE
ERCP 01/07/2020 12:35 PM [facility], [address] [place] [address] [phone]     Endoscopy Department   _______________________________________________________________________________   Patient Name: Yony Zimmer          Procedure Date: 1/7/2020 12:35 PM   MRN: [id number]                       Account Number: [id number]   YOB: 1990              Admit Type: Inpatient   Age: 29                               Room: OR   Gender: Male                          Note Status: Finalized   Attending MD: John Prado MD   Total Sedation Time:   _______________________________________________________________________________       Procedure:           ERCP   Indications:         Treatment of bile leak   Providers:           John Prado MD   Patient Profile:     Mr Zimmer is a 28yo who had a cholecystectomy on new                        years domenic and is now found to have evidence of leak                        without signs of overt infection who now proceeds to                        semi-urgent ERCP as he is without a NALDO.   Referring MD:        Beba Cantrell   Medicines:           General Anesthesia, Antibiotics as scheduled,                        Indomethacin 100 mg LA   Complications:       No immediate complications.   _______________________________________________________________________________   Procedure:           Pre-Anesthesia Assessment:                        - Prior to the procedure, a History and Physical was                        performed, and patient medications and allergies were                        reviewed. The patient is competent. The risks and                        benefits of the procedure and the sedation options and                        risks were discussed with the patient. All questions                        were answered and informed consent was obtained. Patient                        identification and proposed  procedure were verified by                        the nurse in the pre-procedure area. Mental Status                        Examination: alert and oriented. Airway Examination:                        Mallampati Class II (the uvula but not tonsillar pillars                        visualized). Respiratory Examination: clear to                        auscultation. CV Examination: normal. ASA Grade                        Assessment: I - A normal, healthy patient. After                        reviewing the risks and benefits, the patient was deemed                        in satisfactory condition to undergo the procedure. The                        anesthesia plan was to use general anesthesia.                        Immediately prior to administration of medications, the                        patient was re-assessed for adequacy to receive                        sedatives. The heart rate, respiratory rate, oxygen                        saturations, blood pressure, adequacy of pulmonary                        ventilation, and response to care were monitored                        throughout the procedure. The physical status of the                        patient was re-assessed after the procedure. After                        obtaining informed consent, the scope was passed under                        direct vision. Throughout the procedure, the patient's                        blood pressure, pulse, and oxygen saturations were                        monitored continuously. The duodenoscope was introduced                        through the mouth, and used to inject contrast into and                        used to inject contrast into the bile duct and ventral                        pancreatic duct. The ERCP was accomplished without                        difficulty. The patient tolerated the procedure well.                                                                                     Findings:        A   "film of the right upper quadrant demonstrated the        cholecystectomy clips. Limited white light views of the foregut was        notable for an awkward facing ampulla. Deep cannulation of the        pancreatic duct was difficult after shallow cannulation, and required        various device attempts as well as change to supine position (Mod22).        The ventral pancreatic duct was ultimately deeply cannulated with the        short-nosed traction sphincterotome in concert with an angled 0.025\"        Visiglide wire. Contrast was injected and I personally interpreted the        pancreatic duct images. Ductal flow of contrast was adequate, image        quality was excellent and contrast extended to the pancreatic duct which        was thin and normal. Dual wire technique was not feasible so a 4 Fr by        10 cm stent Treviño with a single external pigtail and no internal flaps        was placed 10 cm into the ventral pancreatic duct. Clear fluid flowed        through the stent and the stent was in good position. The bile duct was        deeply cannulated with the short-nosed traction sphincterotome and        angled wire. Contrast was injected. The entire bilairy system was        decompressed and health appearing, though the distal duct and or the        sphincter was tight. Moreover, extravasation of contrast originating        from likely the cystic duct was observed. A 3 mm biliary sphincterotomy        was made with a monofilament traction (standard) sphincterotome using        ERBE electrocautery. There was no post-sphincterotomy bleeding. A 7 Fr        by 9 cm stent with a single external pigtail and a single internal flap        was placed 9 cm into the common bile duct. Bile flowed through the stent        and the stent was in good position. A second 7F stent would not pass due        to the tight distal nature of the duct and or sphincter.                                                                 "                     Impression:          - Normal variant anatomy with awkward facing ampulla                        requiring change in position (to supine) and initial                        cannulation and prophylactic stent placement to the main                        pancreatic duct                        - Overt extravasation, however, aggressive injection was                        avoided so definitive defect location unclear, however                        suspect cystic                        - Uncomplicated biliary sphincterotomy and biliary stent                        placement   Recommendation:      - Nil per os for at least 4h; with novel abdominal pain,                        check a lipase and continue bowel rest with IV fluids;                        without novel pain, advance to clear liquids and move                        forward fom there                        - IV antibiotics for at least the next 24h given                        extravasation of contrast and complete an oral thereafter                        - Repeat ERCP in 6-8 weeks for stent removal/exchange                        and leak interrogation                        - Avoid antithrombotics for at least three days                        - The findings and recommendations were discussed with                        the patient and their family                                                                                       electronically signed by MARIAH Prado

## 2020-01-07 NOTE — CONSULTS
SURGERY CONSULT  Yony Zimmer MRN# [id number]   YOB: 1990 Age: 29 year old     Date of Admission: 01/07/20    REASON FOR CONSULTATION: Post op bile leak     HPI: Yony Zimmer is a 29 year old year old male with history of heroin abuse status post laparoscopic cholecystectomy for biliary colic 12/31/19 at [facility]. Presented 1/7 with fevers, chills and abdominal pain and was found to have biliary leak. CT and HIDA suggestive of bile leak. WBC 12.5, total bili 1.1, INR 1.05. Has been given ertapenem. Status post ERCP with stent with GI today.  Feeling well post operatively. Pain controlled. No nausea/vomiting.     REVIEW OF SYSTEMS: The remainder of the complete ROS was negative unless noted in the HPI. Denies visual changes, headache, sore throat, rhinorrhea, chest pain, sob, night sweats, weight loss.     PAST MEDICAL HISTORY:   Past Medical History:   Diagnosis Date     Migraine        PAST SURGICAL HISTORY:   Past Surgical History:   Procedure Laterality Date     NO HISTORY OF SURGERY         ALLERGIES:    Allergies   Allergen Reactions     Amoxicillin Hives       HOME MEDICATIONS: [COMPLETED] 0.9% sodium chloride BOLUS  [COMPLETED] fentaNYL (PF) (SUBLIMAZE) injection  mcg  [COMPLETED] iopamidol (ISOVUE-370) solution 68 mL  [COMPLETED] ketorolac (TORADOL) injection 15 mg  [COMPLETED] lactated ringers BOLUS 1,000 mL  [COMPLETED] metoclopramide (REGLAN) injection 10 mg  [COMPLETED] morphine (PF) injection 6 mg  [COMPLETED] ondansetron (ZOFRAN) injection 4 mg  [COMPLETED] ondansetron (ZOFRAN) injection 4 mg  [COMPLETED] pantoprazole (PROTONIX) 40 mg IV push injection  [COMPLETED] sodium chloride 0.9 % bag 500mL for CT scan flush use  [COMPLETED] sucralfate (CARAFATE) suspension 1 g  [COMPLETED] technetium Tc 99m mebrofenin (CHOLETEC) radioisotope injection 5.5 millicurie    escitalopram (LEXAPRO) 10 MG tablet, Take 10 mg by mouth daily  hydrOXYzine (VISTARIL) 50 MG capsule,  "Take 1 capsule (50 mg) by mouth 3 times daily as needed For anxiety  oxyCODONE-acetaminophen (PERCOCET) 5-325 MG tablet, Take 1-2 tablets by mouth every 6 hours as needed        SOCIAL HISTORY:   Social History     Tobacco Use     Smoking status: Current Every Day Smoker     Packs/day: 0.25     Years: 7.00     Pack years: 1.75     Types: Cigarettes     Smokeless tobacco: Never Used   Substance Use Topics     Alcohol use: Yes     Comment: 1-2 x week,2-3drinks     Drug use: No       FAMILY HISTORY:   Family History   Problem Relation Age of Onset     Arthritis Father         rhuematoid arthritis     Cancer Father         GI     Heart Disease Maternal Grandfather      Cancer Paternal Grandmother         lung cancer     Neurologic Disorder Paternal Grandfather         parkinsons     Anxiety Disorder Brother        PHYSICAL EXAMINATION:  /73 (BP Location: Left arm)   Pulse 91   Temp 99.3  F (37.4  C) (Oral)   Resp 18   Ht 1.803 m (5' 11\")   Wt 67.1 kg (147 lb 14.9 oz)   SpO2 99%   BMI 20.63 kg/m       General: NAD, awake and alert   CV: Non-cyanotic   Pulm: No increased work of breathing on room air   Abd: soft, minimally-distended, appropriately tender to palpation, incisions c/d/I, no guarding/rebound.   : No matos   Extremities: WWP without edema  Neuro: No focal deficits noted, patient moves all extremities spontaneously    LABS:  Recent Labs   Lab 01/07/20  1025   WBC 12.5*   RBC 4.44   HGB 13.8   HCT 42.0   MCV 95   MCH 31.1   MCHC 32.9   RDW 11.9          Recent Labs   Lab 01/07/20  0617      POTASSIUM 3.7   CHLORIDE 105   CO2 28   BUN 13   CR 0.73   GLC 95   MARYANA 8.7       Recent Labs   Lab 01/07/20  0617   AST 18   ALT 30   ALKPHOS 48   BILITOTAL 1.1   ALBUMIN 3.4   INR 1.05       IMAGING:  NUCLEAR MEDICINE HEPATOBILIARY SCAN 1/6/2020 7:46 PM     INDICATION: Postoperative cholecystectomy, evaluate for bile leak.      COMPARISON: CT abdomen pelvis on same day earlier.     TECHNIQUE: The " patient received 5.5 mCi Tc-Mebrofenin intravenously.  Images were obtained out through 70 minutes.      FINDINGS: Small amount of radiotracer appear to track along the  anterior and posterior aspects of the liver on delayed images, finding  suggestive of bile leak.                                                                      IMPRESSION: Small amount of radiotracer noted tracking along the  anterior and posterior aspects of the liver, findings suggestive of  bile leak.    CT ABDOMEN AND PELVIS WITH CONTRAST   1/6/2020 4:14 PM     IMPRESSION:  1. There is a small amount of nonloculated fluid density in the  gallbladder fossa with some small amount of fluid extending into the  pelvis. This is likely a small amount of residual postoperative  serosanguineous fluid. A bile leak cannot be entirely excluded. If  there are physical findings or laboratory evidence suggestive of bile  leak, consider confirmation with hepatobiliary scan.  2. Moderate-sized hiatal hernia.  3. No evidence of intra-abdominal abscess.       A/P: Yony Zimmer is a 29 year old male with bile leak after laparoscopic cholecystectomy. Now status post ERCP with stent placement with GI 01/07/20.     - No indication for urgent operative intervention.   - Do not recommend percutaneous drainage at this time.   - Surgery will sign off, please call with questions.     Discussed with staff, Dr. Rasheed Carterormick   Surgery Resident

## 2020-01-07 NOTE — H&P
[facility], [place]    History and Physical - [facility] Service        Date of Admission:  1/7/2020    Assessment & Plan   Yony Zimmer is a 29 year old male with PMH of anxiety who presents with acute onset abdominal pain presumed secondary to bile leak from recent laparoscopic cholecystectomy.     Acute abdominal pain  Bile leak s/p elective laparoscopic cholecystectomy (12/31/19)  Patient presents with acute abdominal pain after elective laparoscopic cholecystectomy on 12/31/19 found to have chills, leukocytosis, normal LFTs and lipase, but CT abdomen showing nonloculated fluid density in the gallbladder fossa and subsquent HID scan with findings suggestive of bile leak. Received 1 g ertapenem in the ED  - NPO now  - LR at 125 ml/hr x 1 L   - continue 1 gm ertapenem (allergy to amoxicillin)   - panc/bili consult  - IV dilaudid prn for pain   - prn zofran    Anxiety: hold pta lexapro, atarax given patient NPO        Diet: NPO for Medical/Clinical Reasons Except for: Ice Chips    Fluids: 1 L LR   DVT Prophylaxis: Low Risk/Ambulatory with no VTE prophylaxis indicated  Galicia Catheter: not present  Code Status: FULL     Disposition Plan   Expected discharge: 2 - 3 days, recommended to prior living arrangement once abdominal pain improved, bile leak managed..  Entered: Philip Dumont MD 01/07/2020, 1:56 AM       Patient will be formally staffed in the AM.     Philip Dumont MD  [facility]  Pager: [phone]  Please see sticky note for cross cover information  ______________________________________________________________________    Chief Complaint   Abdominal pain     History is obtained from the patient    History of Present Illness   Yony Zimmer is a 29 year old male with PMH of anxiety who presents with acute onset abdominal pain.     Patient had elective outpatient laparoscopic cholecystectomy on 12/31/19 for  "symptomatic cholelithiasis and went home same day after the procedure feeling fine until last night when he developed acute onset of abodminal focused in RUQ and epigastric area with associated episode of nausea and bilious emesis x 1.  He initially thought it was gas pain from procedure. He went to sleep and awoke this morning with RUQ pain radiating to his chest and then had 2 subsequent \"attacks\" of abdominal pain. He presented to the ED and had an episode of emesis there. He also endorses associated chills w/o fever. He denies any other chest pain / SOB / nasal congestion / sore throat / cough / constipation (last BM was last evening) / diarrhea / blood or black stools / dysuria or hematuria.      In the ED he was found to have temperature 100.0 and was hemodynamically stable. Labs notable for WBC 14.9 otherwise unremarkable CMP, lipase, and lactate. CT abdomen/pelvis showed small amount of nonloculated fluid density in the  gallbladder fossa w/o evidence of intraabdominal abscess.  Subsequent HIDA scan showed small amount of radiotracer noted tracking along the anterior and posterior aspects of the liver, findings suggestive of bile leak. He was treated with 1 L NS, 1 g ertapenem (patient with allergy to amoxicillin), IV zofran, reglan, protonix, and dilaudid and transferred to general medicine for further cares.     Review of Systems    The 10 point Review of Systems is negative other than noted in the HPI or here.     Past Medical History    Migraine   Anxiety  Hx of chemical dependency and meth overdose    Past Surgical History   Elective outpatient laparoscopic cholecystectomy on 12/31/19 for symptomatic cholelithiasis    Social History     He lives in [place], [place] alone. Works as an . Parents live closer and had been staying with them since after his procedure. Smokes 4-6 cigarettes/day.  Drinks only on weekends. Denies illicits.     Family History     Family History   Problem " Relation Age of Onset     Arthritis Father         rhuematoid arthritis     Cancer Father         GI     Heart Disease Maternal Grandfather      Cancer Paternal Grandmother         lung cancer     Neurologic Disorder Paternal Grandfather         parkinsons     Anxiety Disorder Brother      Prior to Admission Medications   Prior to Admission Medications   Prescriptions Last Dose Informant Patient Reported? Taking?   escitalopram (LEXAPRO) 10 MG tablet  Self Yes No   Sig: Take 10 mg by mouth daily   hydrOXYzine (VISTARIL) 50 MG capsule  Self No No   Sig: Take 1 capsule (50 mg) by mouth 3 times daily as needed For anxiety   oxyCODONE-acetaminophen (PERCOCET) 5-325 MG tablet  Self Yes No   Sig: Take 1-2 tablets by mouth every 6 hours as needed      Facility-Administered Medications: None     Allergies   Allergies   Allergen Reactions     Amoxicillin Hives       Physical Exam   Vital Signs: Temp: 100  F (37.8  C) Temp src: Oral BP: 128/68 Pulse: 91   Resp: 16 SpO2: 100 % O2 Device: None (Room air)    Weight: 147 lbs 14.86 oz    Constitutional: awake, alert, cooperative, no apparent distress, and appears stated age  Eyes: Lids and lashes normal, pupils equal, round and reactive to light, extra ocular muscles intact, sclera clear, conjunctiva normal  ENT: Normocephalic, without obvious abnormality, atraumatic, external ears without lesions, oral pharynx with moist mucous membranes   Respiratory: No increased work of breathing, good air exchange, clear to auscultation bilaterally, no crackles or wheezing  Cardiovascular: Normal apical impulse, regular rate and rhythm, normal S1 and S2, no S3 or S4, and no murmur noted  GI: Bandaged incisions s/p lap elva c/d/i, diffusely TTP worse in RUQ and epigastrum with w/o rebound, mild guarding, no significant abdominal pain when shaking the bed, mild distension  Skin: normal skin color, texture, turgor  Musculoskeletal: full range of motion noted  Neurologic: Awake, alert, oriented  to name, place and time.  Cranial nerves II-XII are grossly intact. Moves all 4 extremities   Neuropsychiatric: Pleasant and cooperative    Data   Data reviewed today: I reviewed all medications, new labs and imaging results over the last 24 hours.   Recent Labs   Lab 01/06/20  1403   WBC 14.9*   HGB 16.0   MCV 92         POTASSIUM 3.7   CHLORIDE 104   CO2 29   BUN 14   CR 0.76   ANIONGAP 5   MARYANA 9.7   GLC 98   ALBUMIN 4.5   PROTTOTAL 8.4   BILITOTAL 0.6   ALKPHOS 58   ALT 42   AST 23   LIPASE 65*     Recent Results (from the past 24 hour(s))   CT Abdomen Pelvis w Contrast    Narrative    CT ABDOMEN AND PELVIS WITH CONTRAST   1/6/2020 4:14 PM     HISTORY:  Post-op pain status post elva.    TECHNIQUE:  68 mL Isovue 370. CT images of the abdomen and pelvis  following nonionic intravenous contrast. Radiation dose for this scan  was reduced using automated exposure control, adjustment of the mA  and/or kV according to patient size, or iterative reconstruction  technique.    COMPARISON: None.    FINDINGS: Moderate-sized hiatal hernia. The liver and spleen are  unremarkable. The gallbladder has been removed. There is some  nonloculated fluid density in the gallbladder fossa associated with  trace fluid extending down to the pelvis.    The gallbladder, adrenal glands, and kidneys are unremarkable. No  abdominal or retroperitoneal lymphadenopathy. No free intraperitoneal  air. No pelvic lymphadenopathy. Urinary bladder is unremarkable. No  lytic or blastic bone lesions. Lung bases are unremarkable.      Impression    IMPRESSION:  1. There is a small amount of nonloculated fluid density in the  gallbladder fossa with some small amount of fluid extending into the  pelvis. This is likely a small amount of residual postoperative  serosanguineous fluid. A bile leak cannot be entirely excluded. If  there are physical findings or laboratory evidence suggestive of bile  leak, consider confirmation with hepatobiliary  scan.  2. Moderate-sized hiatal hernia.  3. No evidence of intra-abdominal abscess.    SAMUEL SOTO MD   NM HepatOBiliary Scan    [facility]    NUCLEAR MEDICINE HEPATOBILIARY SCAN 1/6/2020 7:46 PM    INDICATION: Postoperative cholecystectomy, evaluate for bile leak.     COMPARISON: CT abdomen pelvis on same day earlier.    TECHNIQUE: The patient received 5.5 mCi Tc-Mebrofenin intravenously.  Images were obtained out through 70 minutes.     FINDINGS: Small amount of radiotracer appear to track along the  anterior and posterior aspects of the liver on delayed images, finding  suggestive of bile leak.      Impression    IMPRESSION: Small amount of radiotracer noted tracking along the  anterior and posterior aspects of the liver, findings suggestive of  bile leak.    LEO JOEL MD

## 2020-01-07 NOTE — PROGRESS NOTES
[facility], [place]    Progress Note - [facility] Service        Date of Admission:  1/7/2020    Assessment & Plan   Yony Zimmer is a 29 year old male with PMH of anxiety who presents with acute onset abdominal pain presumed secondary to bile leak from recent laparoscopic cholecystectomy.      Acute abdominal pain  Bile leak s/p elective laparoscopic cholecystectomy (12/31/19)  Patient presents with acute abdominal pain after elective laparoscopic cholecystectomy on 12/31/19 found to have chills, leukocytosis, normal LFTs and lipase, but CT abdomen showing nonloculated fluid density in the gallbladder fossa and subsquent HID scan with findings suggestive of bile leak.  GI consulted with ERCP with stenting on 1/7 that was uncomplicated.  Initially treated with IV ertapenem with transition to IV ceftriaxone on 1/8.    -GI consulted, appreciate recommendations  -Continue IV antibiotics with ceftriaxone  -Plan for transition to Cipro Flagyl on discharge on 1/9 for completion of 7-day total course of antibiotics  -Follow-up with GI as outpatient for repeat ERCP in 6 to 8 weeks for stent removal or exchange with leak interrogation  -No anticoagulation until at least 1/10  - Discontinue IV Dilaudid with transition to p.o. oxycodone  - prn zofran     Anxiety: hold pta lexapro, atarax given patient NPO       Diet: Regular Diet Adult    Fluids: None  Lines: PIV  DVT Prophylaxis: Pneumatic Compression Devices  Galicia Catheter: not present  Code Status: Full Code      Disposition Plan   Expected discharge: Tomorrow, recommended to prior living arrangement once antibiotic plan established.  Entered: Violeta Cole MD 01/08/2020, 10:17 PM       The patient's care was discussed with the Attending Physician, Dr. Badillo.    MD [facility] Service  [facility], [place]  Pager: [phone]  Please see sticky note for cross cover  information  ______________________________________________________________________    Interval History   Nursing notes reviewed.  No acute events overnight.  Patient tolerating clear liquid diet.  Ordered breakfast this a.m.  No nausea, no vomiting, abdominal pain improving.  No fevers, no chills.  Plan for additional day IV antibiotics with discharge tomorrow.    Data reviewed today: I reviewed all medications, new labs and imaging results over the last 24 hours. I personally reviewed no images or EKG's today.    Physical Exam   Vital Signs: Temp: 98.2  F (36.8  C) Temp src: Oral BP: 128/81   Heart Rate: 76 Resp: 18 SpO2: 100 % O2 Device: None (Room air)    Weight: 147 lbs 14.86 oz  Constitutional: awake, alert, cooperative, no apparent distress, and appears stated age  Eyes: Lids and lashes normal, pupils equal, round and reactive to light, extra ocular muscles intact, sclera clear, conjunctiva normal  ENT: Normocephalic, without obvious abnormality, atraumatic, external ears without lesions  Respiratory: No increased work of breathing, good air exchange, clear to auscultation bilaterally, no crackles or wheezing  Cardiovascular: Normal apical impulse, regular rate and rhythm, normal S1 and S2, no S3 or S4, and no murmur noted  GI: Bowel sounds present, diffuse mild tenderness to palpation, mild guarding, no rebound  Skin: normal skin color, texture, turgor  Musculoskeletal: full range of motion noted  Neurologic: Awake, alert, oriented to name, place and time.  Cranial nerves II-XII are grossly intact. Moves all 4 extremities   Neuropsychiatric: Pleasant and cooperative    Data   Recent Labs   Lab 01/08/20  0718 01/07/20 2001 01/07/20  1025 01/07/20  0617 01/06/20  1403   WBC 8.3  --  12.5* 12.4* 14.9*   HGB 12.1*  --  13.8 12.8* 16.0   MCV 93  --  95 94 92     --  249 245 322   INR  --   --   --  1.05  --      --   --  137 138   POTASSIUM 3.9  --   --  3.7 3.7   CHLORIDE 104  --   --  105 104    CO2 28  --   --  28 29   BUN 10  --   --  13 14   CR 0.56*  --   --  0.73 0.76   ANIONGAP 5  --   --  4 5   MARYANA 8.7  --   --  8.7 9.7   GLC 98  --   --  95 98   ALBUMIN  --   --   --  3.4 4.5   PROTTOTAL  --   --   --  6.5* 8.4   BILITOTAL  --   --   --  1.1 0.6   ALKPHOS  --   --   --  48 58   ALT  --   --   --  30 42   AST  --   --   --  18 23   LIPASE  --  393  --   --  65*     No results found for this or any previous visit (from the past 24 hour(s)).

## 2020-01-07 NOTE — OR NURSING
Dr Prado at bedside at 1530.  He told patient to be NPO for at least 4 hours and then draw lipase. He was text paged at this time for transfer orders

## 2020-01-07 NOTE — ANESTHESIA PREPROCEDURE EVALUATION
Anesthesia Pre-Procedure Evaluation    Patient: Yony Zimmer   MRN:     [id number] Gender:   male   Age:    29 year old :      1990        Preoperative Diagnosis: * No pre-op diagnosis entered *   Procedure(s):  ENDOSCOPIC RETROGRADE CHOLANGIOPANCREATOGRAPHY     Past Medical History:   Diagnosis Date     Migraine       Past Surgical History:   Procedure Laterality Date     NO HISTORY OF SURGERY            Anesthesia Evaluation     . Pt has had prior anesthetic. Type: General    No history of anesthetic complications          ROS/MED HX    ENT/Pulmonary:     (+)tobacco use, Current use 0.5 packs/day  , . .    Neurologic:     (+)migraines,     Cardiovascular:  - neg cardiovascular ROS       METS/Exercise Tolerance:  >4 METS   Hematologic:  - neg hematologic  ROS       Musculoskeletal:  - neg musculoskeletal ROS       GI/Hepatic: Comment: [place] 19-->?bile leak        Renal/Genitourinary:  - ROS Renal section negative       Endo:  - neg endo ROS       Psychiatric:     (+) psychiatric history anxiety      Infectious Disease:  - neg infectious disease ROS       Malignancy:      - no malignancy   Other:                         PHYSICAL EXAM:   Mental Status/Neuro: A/A/O   Airway: Facies: Feasible  Mallampati: I  Mouth/Opening: Full  TM distance: > 6 cm  Neck ROM: Full   Respiratory: Auscultation: CTAB     Resp. Rate: Normal     Resp. Effort: Normal      CV: Rhythm: Regular  Rate: Tachy  Heart: Normal Sounds  Edema: None   Comments:      Dental: Normal Dentition                LABS:  CBC:   Lab Results   Component Value Date    WBC 12.4 (H) 2020    WBC 14.9 (H) 2020    HGB 12.8 (L) 2020    HGB 16.0 2020    HCT 38.7 (L) 2020    HCT 47.0 2020     2020     2020     BMP:   Lab Results   Component Value Date     2020     2020    POTASSIUM 3.7 2020    POTASSIUM 3.7 2020    CHLORIDE 105 2020    CHLORIDE 104  "01/06/2020    CO2 28 01/07/2020    CO2 29 01/06/2020    BUN 13 01/07/2020    BUN 14 01/06/2020    CR 0.73 01/07/2020    CR 0.76 01/06/2020    GLC 95 01/07/2020    GLC 98 01/06/2020     COAGS:   Lab Results   Component Value Date    PTT 26 09/29/2010    INR 1.05 01/07/2020     POC: No results found for: BGM, HCG, HCGS  OTHER:   Lab Results   Component Value Date    LACT 0.8 01/06/2020    MARYANA 8.7 01/07/2020    ALBUMIN 3.4 01/07/2020    PROTTOTAL 6.5 (L) 01/07/2020    ALT 30 01/07/2020    AST 18 01/07/2020    ALKPHOS 48 01/07/2020    BILITOTAL 1.1 01/07/2020    LIPASE 65 (L) 01/06/2020        Preop Vitals    BP Readings from Last 3 Encounters:   01/07/20 128/73   01/07/20 127/73   08/24/15 124/60    Pulse Readings from Last 3 Encounters:   01/07/20 91   01/07/20 91   08/24/15 88      Resp Readings from Last 3 Encounters:   01/07/20 18   01/06/20 13   11/26/14 20    SpO2 Readings from Last 3 Encounters:   01/07/20 99%   01/07/20 100%   05/01/11 99%      Temp Readings from Last 1 Encounters:   01/07/20 37.4  C (99.3  F) (Oral)    Ht Readings from Last 1 Encounters:   01/07/20 1.803 m (5' 11\")      Wt Readings from Last 1 Encounters:   01/07/20 67.1 kg (147 lb 14.9 oz)    Estimated body mass index is 20.63 kg/m  as calculated from the following:    Height as of this encounter: 1.803 m (5' 11\").    Weight as of this encounter: 67.1 kg (147 lb 14.9 oz).     LDA:  Peripheral IV 01/06/20 Right Upper forearm (Active)   Site Assessment WDL 1/7/2020  7:49 AM   Line Status Infusing 1/7/2020  7:49 AM   Phlebitis Scale 0-->no symptoms 1/7/2020  7:49 AM   Infiltration Scale 0 1/7/2020  7:49 AM   Infiltration Site Treatment Method  None 1/7/2020  2:00 AM   Extravasation? No 1/7/2020  7:49 AM   Number of days: 1        Assessment:   ASA SCORE: 2    H&P: History and physical reviewed and following examination; no interval change.   Smoking Status:  Non-Smoker/Unknown   NPO Status: NPO Appropriate     Plan:   Anes. Type:  General "   Pre-Medication: None   Induction:  IV (Standard)   Airway: ETT; Oral   Access/Monitoring: PIV   Maintenance: Balanced     Postop Plan:   Postop Pain: Opioids  Postop Sedation/Airway: Not planned     PONV Management:   Adult Risk Factors:, Non-Smoker, Postop Opioids   Prevention: Ondansetron     CONSENT: Direct conversation   Plan and risks discussed with: Patient   Blood Products: Consent Deferred (Minimal Blood Loss)       Comments for Plan/Consent:  Placement Date: 12/31/19; Placement Time: 0738; Placed By: CRNA; Induction Type: Pre-O2, IV; Masking: Not attempted; ETT Type: ETT; Orientation: Center; Size (mm): 7.0; Depth Secured (cm): 25 cm; Cuffed: Cuffed; Intubation Method: Video laryngoscopy; Cormack_Lehane Glottic Grade: Grade 1; Glottic View: Cords Open; Blade: Glidescope; Blade Size: 4; Insertion attempts: 1; Difficulty: Atraumatic; Adjunct Equipment: Stylet; Placement Verification: BBSE, auscultation, capnometry, Positive EtCO2; Teeth and Lips Unchanged: Unchanged; Removal Date: 12/31/19; Removal Time: 0826; Transferred with Oxygen: No                 Sandra Whitley MD

## 2020-01-07 NOTE — OR NURSING
In Handoff from [facility]yah -anesthesia postop, she stated patient to be given a second liter of IV fluid which was confirmed by Dr Prado when he was at bedside

## 2020-01-07 NOTE — ANESTHESIA POSTPROCEDURE EVALUATION
Anesthesia POST Procedure Evaluation    Patient: Yony Zimmer   MRN:     [id number] Gender:   male   Age:    29 year old :      1990        Preoperative Diagnosis: * No pre-op diagnosis entered *   Procedure(s):  ENDOSCOPIC RETROGRADE CHOLANGIOPANCREATOGRAPHY, WITH BILIARY SPHINCTEROTOMY, PANCREATIC AND BILIARY STENT PLACEMENT   Postop Comments: No value filed.       Anesthesia Type:  Not documented  General    Reportable Event: NO     PAIN: Uncomplicated   Sign Out status: Comfortable, Well controlled pain     PONV: No PONV   Sign Out status:  No Nausea or Vomiting     Neuro/Psych: Uneventful perioperative course   Sign Out Status: Preoperative baseline; Age appropriate mentation     Airway/Resp.: Uneventful perioperative course   Sign Out Status: Non labored breathing, age appropriate RR; Resp. Status within EXPECTED Parameters     CV: Uneventful perioperative course   Sign Out status: Appropriate BP and perfusion indices; Appropriate HR/Rhythm     Disposition:   Sign Out in:  PACU  Disposition:  Floor  Recovery Course: Uneventful  Follow-Up: Not required           Last Anesthesia Record Vitals:  CRNA VITALS  2020 1439 - 2020 1539      2020             NIBP:  149/90    Ht Rate:  122          Last PACU Vitals:  Vitals Value Taken Time   /93 2020  3:50 PM   Temp 37.1  C (98.7  F) 2020  3:45 PM   Pulse 88 2020  3:50 PM   Resp 12 2020  3:45 PM   SpO2 99 % 2020  3:58 PM   Temp src     NIBP 149/90 2020  3:14 PM   Pulse     SpO2 97 % 2020  3:12 PM   Resp     Temp     Ht Rate 122 2020  3:14 PM   Temp 2     Vitals shown include unvalidated device data.      Electronically Signed By: Eloise Jones MD, 2020, 3:59 PM

## 2020-01-07 NOTE — CONSULTS
GASTROENTEROLOGY CONSULTATION    Date: 01/07/2020  Date of Admission: 1/7/2020  Reason for Consultation: Bile leak  Requested by:   Philip Dumont MD  Brief Summary:   We were asked by Philip Dumont MD of medicine to evaluate this patient with bile leak      ASSESSMENT AND RECOMMENDATIONS:   Assessment:  29 year old male with a history of anxiety who underwent elective lap cholecystectomy on 12/31/19 for symptomatic cholelithiasis came with acute abdominal pain and chills, CT abd and HIDA scan consistent with bile leak. Pt had leukocytosis, blood Cx pending, has been started on ertapenem. He will require an ERCP for biliary stent placement for the leak.     Recommendations  - Plan for ERCP today  - Continue broad spectrum antibiotics for now, may need a short course post ERCP depending upon contrast leak during procedure  - Maintain NPO for the procedure  - Pain management as per primary team    Gastroenterology outpatient follow up recommendations: pending clinical course    Thank you for involving us in this patient's care. Please do not hesitate to contact the GI service with any questions or concerns.     Pt care plan discussed with Dr. Prado, GI staff physician.    Darell Lockwood MD  -------------------------------------------------------------------------------------------------------------------           History of Present Illness:   Yony Zimmer is a 29 year old male with a history of anxiety and recent lap cholecystectomy presented with acute abdominal pain.    Pt stated that he had nausea vomiting, post prandial pain for past 6 months off and on and symptoms of heart burn, was evaluated and found to acute vs chronic cholecystitis, underwent lap cholecystectomy on 12/31/19, post surgical course was uncomplicated with some post prandial pain but he started developing severe epigastric to RUQ pain 2 days back which was 9/10 and mentions as worse pain in life. He took oxycodone left  from surgery but did not help much. He had sweats and chills. Pain lasted for 1 hr. Next morning the pain started again along with episode of bilious vomiting. He came to [facility] for evaluation.   He uses NSAIDS 6-7 tabs of ibuprofen weekly.    Smokes 3-6 cig per day, 3-4 beer on weekend, no other recreational drugs.              Past Medical History:   Reviewed and edited as appropriate  Past Medical History:   Diagnosis Date     Migraine             Past Surgical History:   Reviewed and edited as appropriate   Past Surgical History:   Procedure Laterality Date     NO HISTORY OF SURGERY              Previous Endoscopy:   No results found for this or any previous visit.         Social History:   Reviewed and edited as appropriate  Social History     Socioeconomic History     Marital status: Single     Spouse name: Not on file     Number of children: Not on file     Years of education: Not on file     Highest education level: Not on file   Occupational History     Not on file   Social Needs     Financial resource strain: Not on file     Food insecurity:     Worry: Not on file     Inability: Not on file     Transportation needs:     Medical: Not on file     Non-medical: Not on file   Tobacco Use     Smoking status: Current Every Day Smoker     Packs/day: 0.25     Years: 7.00     Pack years: 1.75     Types: Cigarettes     Smokeless tobacco: Never Used   Substance and Sexual Activity     Alcohol use: Yes     Comment: 1-2 x week,2-3drinks     Drug use: No     Sexual activity: Never   Lifestyle     Physical activity:     Days per week: Not on file     Minutes per session: Not on file     Stress: Not on file   Relationships     Social connections:     Talks on phone: Not on file     Gets together: Not on file     Attends Jewish service: Not on file     Active member of club or organization: Not on file     Attends meetings of clubs or organizations: Not on file     Relationship status: Not on file     Intimate partner  "violence:     Fear of current or ex partner: Not on file     Emotionally abused: Not on file     Physically abused: Not on file     Forced sexual activity: Not on file   Other Topics Concern     Parent/sibling w/ CABG, MI or angioplasty before 65F 55M? No   Social History Narrative     Not on file            Family History:   Reviewed and edited as appropriate  Family History   Problem Relation Age of Onset     Arthritis Father         rhuematoid arthritis     Cancer Father         GI     Heart Disease Maternal Grandfather      Cancer Paternal Grandmother         lung cancer     Neurologic Disorder Paternal Grandfather         parkinsons     Anxiety Disorder Brother      Father has GIST.          Allergies:   Reviewed and edited as appropriate     Allergies   Allergen Reactions     Amoxicillin Hives            Medications:     Current Facility-Administered Medications   Medication     ertapenem (INVanz) 1 g vial to attach to  mL bag     HYDROmorphone (PF) (DILAUDID) injection 0.3-0.5 mg     [START ON 1/8/2020] influenza quadrivalent (PF) vacc (FLUZONE) injection 0.5 mL     lactated ringers BOLUS 1,000 mL     lidocaine (LMX4) cream     lidocaine 1 % 0.1-1 mL     melatonin tablet 1 mg     naloxone (NARCAN) injection 0.1-0.4 mg     ondansetron (ZOFRAN-ODT) ODT tab 4 mg    Or     ondansetron (ZOFRAN) injection 4 mg     sodium chloride (PF) 0.9% PF flush 3 mL     sodium chloride (PF) 0.9% PF flush 3 mL             Review of Systems:     A complete 10 point review of systems was performed and is negative except as noted in the HPI           Physical Exam:   /68 (BP Location: Left arm)   Pulse 91   Temp 100  F (37.8  C) (Oral)   Resp 16   Ht 1.803 m (5' 11\")   Wt 67.1 kg (147 lb 14.9 oz)   SpO2 100%   BMI 20.63 kg/m    Wt:   Wt Readings from Last 2 Encounters:   01/07/20 67.1 kg (147 lb 14.9 oz)   01/06/20 63.5 kg (140 lb)      Constitutional: cooperative, pleasant, not dyspneic/diaphoretic, no acute " distress  Eyes: Sclera anicteric  Ears/nose/mouth/throat:  mucus membranes moist  Neck: supple  CV: No edema  Respiratory: Unlabored breathing  Abd: Nondistended, +bs, epigastric and RUQ tenderness to palpation  Skin: warm, perfused  Neuro: AAO x 3  MSK: No gross deformities         Data:   Labs and imaging below were independently reviewed and interpreted    BMP  Recent Labs   Lab 01/06/20  1403      POTASSIUM 3.7   CHLORIDE 104   MARYANA 9.7   CO2 29   BUN 14   CR 0.76   GLC 98     CBC  Recent Labs   Lab 01/06/20  1403   WBC 14.9*   RBC 5.10   HGB 16.0   HCT 47.0   MCV 92   MCH 31.4   MCHC 34.0   RDW 11.9        INRNo lab results found in last 7 days.  LFTs  Recent Labs   Lab 01/06/20  1403   ALKPHOS 58   AST 23   ALT 42   BILITOTAL 0.6   PROTTOTAL 8.4   ALBUMIN 4.5      PANC  Recent Labs   Lab 01/06/20  1403   LIPASE 65*       Imaging:  CT ABD :  IMPRESSION:  1. There is a small amount of nonloculated fluid density in the  gallbladder fossa with some small amount of fluid extending into the  pelvis. This is likely a small amount of residual postoperative  serosanguineous fluid. A bile leak cannot be entirely excluded. If  there are physical findings or laboratory evidence suggestive of bile  leak, consider confirmation with hepatobiliary scan.  2. Moderate-sized hiatal hernia.  3. No evidence of intra-abdominal abscess.    HIDA scan:  IMPRESSION: Small amount of radiotracer noted tracking along the  anterior and posterior aspects of the liver, findings suggestive of  bile leak.

## 2020-01-07 NOTE — PLAN OF CARE
"/68 (BP Location: Left arm)   Pulse 91   Temp 100  F (37.8  C) (Oral)   Resp 16   Ht 1.803 m (5' 11\")   Wt 67.1 kg (147 lb 14.9 oz)   SpO2 100%   BMI 20.63 kg/m   Tmax-100. OVSS on RA. Patient c/o abdominal pain. Patient denies nausea. Urine Output -voided in toilet x 1.  Bowel Function - Last BM 1/5/20. Nutrition - NPO. Activity - Independent.  Pt admitted from [facility] with a bile leak following a lap elva 6 days ago.    "

## 2020-01-07 NOTE — PROVIDER NOTIFICATION
Purpose of Notification: severe pain  Notified Person: Douglas wallace [id number]  Notification Time: 9:45a  Notification Interaction: Paged    Pt reports pain is severe. IV dilaudid 1 mg was last given at 8:53A. Pt reports not effective. Requesting a different pain med. Team was paged.

## 2020-01-07 NOTE — PROGRESS NOTES
Report given to 3C nurse. Pre-op checklist and CHANNING wipes completed. Awaiting wheelchair transportation.

## 2020-01-07 NOTE — PLAN OF CARE
"/86 (BP Location: Left arm)   Pulse 91   Temp 98.4  F (36.9  C) (Oral)   Resp 16   Ht 1.803 m (5' 11\")   Wt 67.1 kg (147 lb 14.9 oz)   SpO2 100%   BMI 20.63 kg/m      Cared for patient from approximately 1214-6546    Neuro: A&Ox4; calls appropriately  Respiratory: sats mid 90s on RA. Denies SOB  Cardiac: WNL  GI/: +BS, +passing small amounts of gas, no BM this shift. Reports last BM was 1/5.    Diet: NPO for procedure today  Pain: C/O abdominal pain. Scheduled tylenol and PRN IV dilaudid given with small relief.   Incisions/Drains: None  Skin: old lap sites with adhesive strips.   IV Access: Right PIV- saline locked  Activity: Up independently  New changes this shift: ERCP today with stent placement. Currently in OR.   Plan: Continue plan of care.     "

## 2020-01-07 NOTE — ANESTHESIA CARE TRANSFER NOTE
Patient: Yony Zimmer    Procedure(s):  ENDOSCOPIC RETROGRADE CHOLANGIOPANCREATOGRAPHY    Diagnosis: * No pre-op diagnosis entered *  Diagnosis Additional Information: No value filed.    Anesthesia Type:   General     Note:  Airway :Nasal Cannula  Patient transferred to:PACU  Comments: VSS on arrival, report to RN.Handoff Report: Identifed the Patient, Identified the Reponsible Provider, Reviewed the pertinent medical history, Discussed the surgical course, Reviewed Intra-OP anesthesia mangement and issues during anesthesia, Set expectations for post-procedure period and Allowed opportunity for questions and acknowledgement of understanding      Vitals: (Last set prior to Anesthesia Care Transfer)    CRNA VITALS  1/7/2020 1439 - 1/7/2020 1513      1/7/2020             Resp Rate (observed):  (!) 3                Electronically Signed By: OPAL Dominguez CRNA  January 7, 2020  3:13 PM

## 2020-01-07 NOTE — ED NOTES
Report to lEoise RN station 7B at [facility]. Transportation arranged by NICKOLAS PERKINS 20 minutes.

## 2020-01-08 LAB
ANION GAP SERPL CALCULATED.3IONS-SCNC: 5 MMOL/L (ref 3–14)
BUN SERPL-MCNC: 10 MG/DL (ref 7–30)
CALCIUM SERPL-MCNC: 8.7 MG/DL (ref 8.5–10.1)
CHLORIDE SERPL-SCNC: 104 MMOL/L (ref 94–109)
CO2 SERPL-SCNC: 28 MMOL/L (ref 20–32)
CREAT SERPL-MCNC: 0.56 MG/DL (ref 0.66–1.25)
ERYTHROCYTE [DISTWIDTH] IN BLOOD BY AUTOMATED COUNT: 11.6 % (ref 10–15)
GFR SERPL CREATININE-BSD FRML MDRD: >90 ML/MIN/{1.73_M2}
GLUCOSE SERPL-MCNC: 98 MG/DL (ref 70–99)
HCT VFR BLD AUTO: 35.4 % (ref 40–53)
HGB BLD-MCNC: 12.1 G/DL (ref 13.3–17.7)
MCH RBC QN AUTO: 31.8 PG (ref 26.5–33)
MCHC RBC AUTO-ENTMCNC: 34.2 G/DL (ref 31.5–36.5)
MCV RBC AUTO: 93 FL (ref 78–100)
PLATELET # BLD AUTO: 225 10E9/L (ref 150–450)
POTASSIUM SERPL-SCNC: 3.9 MMOL/L (ref 3.4–5.3)
RBC # BLD AUTO: 3.81 10E12/L (ref 4.4–5.9)
SODIUM SERPL-SCNC: 137 MMOL/L (ref 133–144)
WBC # BLD AUTO: 8.3 10E9/L (ref 4–11)

## 2020-01-08 PROCEDURE — 25000132 ZZH RX MED GY IP 250 OP 250 PS 637: Performed by: STUDENT IN AN ORGANIZED HEALTH CARE EDUCATION/TRAINING PROGRAM

## 2020-01-08 PROCEDURE — 36415 COLL VENOUS BLD VENIPUNCTURE: CPT | Performed by: STUDENT IN AN ORGANIZED HEALTH CARE EDUCATION/TRAINING PROGRAM

## 2020-01-08 PROCEDURE — 25000128 H RX IP 250 OP 636: Performed by: STUDENT IN AN ORGANIZED HEALTH CARE EDUCATION/TRAINING PROGRAM

## 2020-01-08 PROCEDURE — 85027 COMPLETE CBC AUTOMATED: CPT | Performed by: STUDENT IN AN ORGANIZED HEALTH CARE EDUCATION/TRAINING PROGRAM

## 2020-01-08 PROCEDURE — 99232 SBSQ HOSP IP/OBS MODERATE 35: CPT | Mod: GC | Performed by: INTERNAL MEDICINE

## 2020-01-08 PROCEDURE — 12000001 ZZH R&B MED SURG/OB UMMC

## 2020-01-08 PROCEDURE — 25000132 ZZH RX MED GY IP 250 OP 250 PS 637: Performed by: PATHOLOGY

## 2020-01-08 PROCEDURE — 80048 BASIC METABOLIC PNL TOTAL CA: CPT | Performed by: STUDENT IN AN ORGANIZED HEALTH CARE EDUCATION/TRAINING PROGRAM

## 2020-01-08 PROCEDURE — 40000141 ZZH STATISTIC PERIPHERAL IV START W/O US GUIDANCE

## 2020-01-08 RX ORDER — CIPROFLOXACIN 500 MG/1
500 TABLET, FILM COATED ORAL 2 TIMES DAILY
Qty: 8 TABLET | Refills: 0 | Status: SHIPPED | OUTPATIENT
Start: 2020-01-08 | End: 2020-01-09

## 2020-01-08 RX ORDER — ERTAPENEM 1 G/1
1 INJECTION, POWDER, LYOPHILIZED, FOR SOLUTION INTRAMUSCULAR; INTRAVENOUS EVERY 24 HOURS
Status: DISCONTINUED | OUTPATIENT
Start: 2020-01-08 | End: 2020-01-08

## 2020-01-08 RX ORDER — OXYCODONE HYDROCHLORIDE 5 MG/1
5-10 TABLET ORAL EVERY 4 HOURS PRN
Status: DISCONTINUED | OUTPATIENT
Start: 2020-01-08 | End: 2020-01-09 | Stop reason: HOSPADM

## 2020-01-08 RX ORDER — METRONIDAZOLE 500 MG/1
500 TABLET ORAL 3 TIMES DAILY
Status: DISCONTINUED | OUTPATIENT
Start: 2020-01-08 | End: 2020-01-08

## 2020-01-08 RX ORDER — SIMETHICONE 80 MG
80 TABLET,CHEWABLE ORAL EVERY 6 HOURS PRN
Status: DISCONTINUED | OUTPATIENT
Start: 2020-01-08 | End: 2020-01-09 | Stop reason: HOSPADM

## 2020-01-08 RX ORDER — OXYCODONE HYDROCHLORIDE 5 MG/1
5-10 TABLET ORAL EVERY 4 HOURS PRN
Qty: 5 TABLET | Refills: 0 | Status: SHIPPED | OUTPATIENT
Start: 2020-01-08 | End: 2020-01-09

## 2020-01-08 RX ORDER — CEFTRIAXONE 1 G/1
1 INJECTION, POWDER, FOR SOLUTION INTRAMUSCULAR; INTRAVENOUS EVERY 24 HOURS
Status: DISCONTINUED | OUTPATIENT
Start: 2020-01-08 | End: 2020-01-09 | Stop reason: HOSPADM

## 2020-01-08 RX ORDER — FUROSEMIDE 40 MG
40 TABLET ORAL DAILY
Status: DISCONTINUED | OUTPATIENT
Start: 2020-01-08 | End: 2020-01-08

## 2020-01-08 RX ORDER — CIPROFLOXACIN 500 MG/1
500 TABLET, FILM COATED ORAL EVERY 12 HOURS SCHEDULED
Status: DISCONTINUED | OUTPATIENT
Start: 2020-01-08 | End: 2020-01-08

## 2020-01-08 RX ORDER — METRONIDAZOLE 500 MG/1
500 TABLET ORAL 3 TIMES DAILY
Qty: 12 TABLET | Refills: 0 | Status: SHIPPED | OUTPATIENT
Start: 2020-01-08 | End: 2020-01-09

## 2020-01-08 RX ORDER — SPIRONOLACTONE 50 MG/1
100 TABLET, FILM COATED ORAL DAILY
Status: DISCONTINUED | OUTPATIENT
Start: 2020-01-08 | End: 2020-01-08

## 2020-01-08 RX ADMIN — POLYETHYLENE GLYCOL 3350 17 G: 17 POWDER, FOR SOLUTION ORAL at 07:43

## 2020-01-08 RX ADMIN — ONDANSETRON 4 MG: 4 TABLET, ORALLY DISINTEGRATING ORAL at 07:55

## 2020-01-08 RX ADMIN — SIMETHICONE CHEW TAB 80 MG 80 MG: 80 TABLET ORAL at 17:52

## 2020-01-08 RX ADMIN — ACETAMINOPHEN 975 MG: 325 TABLET, FILM COATED ORAL at 00:05

## 2020-01-08 RX ADMIN — HYDROMORPHONE HYDROCHLORIDE 1 MG: 1 INJECTION, SOLUTION INTRAMUSCULAR; INTRAVENOUS; SUBCUTANEOUS at 00:46

## 2020-01-08 RX ADMIN — OXYCODONE HYDROCHLORIDE 10 MG: 5 TABLET ORAL at 22:59

## 2020-01-08 RX ADMIN — METRONIDAZOLE 500 MG: 500 TABLET ORAL at 07:55

## 2020-01-08 RX ADMIN — ACETAMINOPHEN 975 MG: 325 TABLET, FILM COATED ORAL at 17:51

## 2020-01-08 RX ADMIN — OXYCODONE HYDROCHLORIDE 10 MG: 5 TABLET ORAL at 18:52

## 2020-01-08 RX ADMIN — CIPROFLOXACIN HYDROCHLORIDE 500 MG: 500 TABLET, FILM COATED ORAL at 07:55

## 2020-01-08 RX ADMIN — HYDROMORPHONE HYDROCHLORIDE 0.5 MG: 1 INJECTION, SOLUTION INTRAMUSCULAR; INTRAVENOUS; SUBCUTANEOUS at 10:26

## 2020-01-08 RX ADMIN — HYDROMORPHONE HYDROCHLORIDE 0.5 MG: 1 INJECTION, SOLUTION INTRAMUSCULAR; INTRAVENOUS; SUBCUTANEOUS at 10:04

## 2020-01-08 RX ADMIN — NICOTINE 1 PATCH: 14 PATCH, EXTENDED RELEASE TRANSDERMAL at 07:42

## 2020-01-08 RX ADMIN — HYDROMORPHONE HYDROCHLORIDE 1 MG: 1 INJECTION, SOLUTION INTRAMUSCULAR; INTRAVENOUS; SUBCUTANEOUS at 07:42

## 2020-01-08 RX ADMIN — ONDANSETRON 4 MG: 4 TABLET, ORALLY DISINTEGRATING ORAL at 23:01

## 2020-01-08 RX ADMIN — ACETAMINOPHEN 975 MG: 325 TABLET, FILM COATED ORAL at 07:52

## 2020-01-08 RX ADMIN — OXYCODONE HYDROCHLORIDE 5 MG: 5 TABLET ORAL at 14:46

## 2020-01-08 RX ADMIN — HYDROMORPHONE HYDROCHLORIDE 1 MG: 1 INJECTION, SOLUTION INTRAMUSCULAR; INTRAVENOUS; SUBCUTANEOUS at 05:17

## 2020-01-08 RX ADMIN — CEFTRIAXONE 1 G: 1 INJECTION, POWDER, FOR SOLUTION INTRAMUSCULAR; INTRAVENOUS at 21:08

## 2020-01-08 ASSESSMENT — ACTIVITIES OF DAILY LIVING (ADL)
ADLS_ACUITY_SCORE: 10

## 2020-01-08 NOTE — PROGRESS NOTES
GASTROENTEROLOGY PROGRESS NOTE    Date: 01/08/2020  Admit Date: 1/7/2020     Brief Summary:     29 year old male with a history of anxiety who underwent elective lap cholecystectomy on 12/31/19 for symptomatic cholelithiasis came with acute abdominal pain and chills, CT abd and HIDA scan consistent with bile leak. S/p ERCP for biliary stent placement for the leak.       ASSESSMENT AND RECOMMENDATIONS:     ASSESSMENT:  29 year old male with a history of anxiety who underwent elective lap cholecystectomy on 12/31/19 for symptomatic cholelithiasis came with acute abdominal pain and chills, CT abd and HIDA scan consistent with bile leak. S/p ERCP for biliary stent placement for the leak. Afebrile, Tolerating diet.     RECOMMENDATIONS:  - Advance diet as tolerates  - Continue IV ABx for completion of 24hrs post ERCP, then switch to oral for 7 days  - Avoid antithrombotics for 72hrs    Gastroenterology follow up recommendations: Repeat ERCP in 6-8 weeks     Thank you for involving us in this patient's care. Please do not hesitate to contact the GI service with any questions or concerns.      Pt care plan discussed with Dr. Edge, GI staff physician.    Darell Lockwood MD  GI Fellow  Pager: [phone]  _______________________________________________________________    Subjective\events within the 24 hours:     Pt was seen at bedside, stated that he had tolerated liquid diet last night with some nausea but no vomiting. Abd pain is much improved as compared to admission but still mild pain, controlled with pain meds.    4 point ROS performed and negative unless noted above.      Medications:     Current Facility-Administered Medications   Medication     acetaminophen (TYLENOL) tablet 975 mg     bisacodyl (DULCOLAX) EC tablet 5 mg    Or     bisacodyl (DULCOLAX) EC tablet 10 mg    Or     bisacodyl (DULCOLAX) EC tablet 15 mg     bisacodyl (DULCOLAX) Suppository 10 mg     ciprofloxacin (CIPRO) tablet 500 mg     HYDROmorphone  "(PF) (DILAUDID) injection 0.5-1 mg     influenza quadrivalent (PF) vacc (FLUZONE) injection 0.5 mL     lidocaine (LMX4) cream     lidocaine 1 % 0.1-1 mL     melatonin tablet 1 mg     metroNIDAZOLE (FLAGYL) tablet 500 mg     naloxone (NARCAN) injection 0.1-0.4 mg     nicotine (NICODERM CQ) 14 MG/24HR 24 hr patch 1 patch     nicotine Patch in Place     ondansetron (ZOFRAN-ODT) ODT tab 4 mg    Or     ondansetron (ZOFRAN) injection 4 mg     polyethylene glycol (MIRALAX/GLYCOLAX) Packet 17 g     polyethylene glycol (MIRALAX/GLYCOLAX) Packet 17 g     sodium chloride (PF) 0.9% PF flush 3 mL     sodium chloride (PF) 0.9% PF flush 3 mL       Physical Exam     Vital Signs:  /64 (BP Location: Left arm)   Pulse 93   Temp 96.7  F (35.9  C) (Oral)   Resp 16   Ht 1.803 m (5' 11\")   Wt 67.1 kg (147 lb 14.9 oz)   SpO2 100%   BMI 20.63 kg/m       Gen: A&Ox3, NAD  HEENT: ncat, perrl, eomi, sclera anicteric  Neck: supple  CV: RRR, S1, S2 heard  Lungs: CTA b/l  Abd: +bs, soft, mid abdomen to epigastric tenderness  Skin: no jaundice  Extremities: No edema  MS: appropriate muscle mass for age  Neuro: non focal       Data   LABS:  BMP  Recent Labs   Lab 01/08/20  0718 01/07/20  0617 01/06/20  1403    137 138   POTASSIUM 3.9 3.7 3.7   CHLORIDE 104 105 104   MARYANA 8.7 8.7 9.7   CO2 28 28 29   BUN 10 13 14   CR 0.56* 0.73 0.76   GLC 98 95 98     CBC  Recent Labs   Lab 01/08/20  0718 01/07/20  1025 01/07/20  0617 01/06/20  1403   WBC 8.3 12.5* 12.4* 14.9*   RBC 3.81* 4.44 4.12* 5.10   HGB 12.1* 13.8 12.8* 16.0   HCT 35.4* 42.0 38.7* 47.0   MCV 93 95 94 92   MCH 31.8 31.1 31.1 31.4   MCHC 34.2 32.9 33.1 34.0   RDW 11.6 11.9 12.0 11.9    249 245 322     INR  Recent Labs   Lab 01/07/20  0617   INR 1.05     LFTs  Recent Labs   Lab 01/07/20  0617 01/06/20  1403   ALKPHOS 48 58   AST 18 23   ALT 30 42   BILITOTAL 1.1 0.6   PROTTOTAL 6.5* 8.4   ALBUMIN 3.4 4.5      PANC  Recent Labs   Lab 01/07/20 2001 01/06/20  1403   LIPASE " 393 65*   AMYLASE 64  --          PROCEDURE:  ERCP:  Impression:          - Normal variant anatomy with awkward facing ampulla                        requiring change in position (to supine) and initial                        cannulation and prophylactic stent placement to the main                        pancreatic duct                        - Overt extravasation, however, aggressive injection was                        avoided so definitive defect location unclear, however                        suspect cystic                        - Uncomplicated biliary sphincterotomy and biliary stent                        placement   Recommendation:      - Nil per os for at least 4h; with novel abdominal pain,                        check a lipase and continue bowel rest with IV fluids;                        without novel pain, advance to clear liquids and move                        forward fom there                        - IV antibiotics for at least the next 24h given                        extravasation of contrast and complete an oral thereafter                        - Repeat ERCP in 6-8 weeks for stent removal/exchange                        and leak interrogation                        - Avoid antithrombotics for at least three days                        - The findings and recommendations were discussed with                        the patient and their family

## 2020-01-08 NOTE — PLAN OF CARE
"Vital signs:  /74 (BP Location: Left arm)   Pulse 93   Temp 99.1  F (37.3  C) (Oral)   Resp 16   Ht 1.803 m (5' 11\")   Wt 67.1 kg (147 lb 14.9 oz)   SpO2 97%   BMI 20.63 kg/m      POD 0 ENDOSCOPIC RETROGRADE CHOLANGIOPANCREATOGRAPHY, WITH BILIARY SPHINCTEROTOMY, PANCREATIC AND BILIARY STENT PLACEMENT      Received pt around 1630 and cared for pt until 2330     Activity: independent   Neuros: A&O x4  Cardiac:WDL, denies chest pain  Respiratory: Ls:clear bilaterally, denies SOB, room air, no cough  GI/: hypoactive BS, +gas, last BM 1/5, voiding adequately   Diet: strict NPO until lipase was drawn, then advance to clear and if tolerates then advance to reg diet  Skin: 4 abdominal lap sites steri stripped, denies numbness/tingling, R PIV, pale, dry  Lines:R PIV SL when not infusing anx  Labs: Amylase 64 Lipase 393  Pain/nausea: Abdominal pain controlled with IV dilaudid, slight nausea later in the evening controlled with PO zofran  Plan:Cont POC, see how patient responds to food and possible discharge 1/8        "

## 2020-01-08 NOTE — PLAN OF CARE
"Vital signs:  Temp: 98.3  F (36.8  C) Temp src: Oral BP: 134/84 Pulse: 93 Heart Rate: 90 Resp: 16 SpO2: 100 % O2 Device: None (Room air) Oxygen Delivery: 1 LPM Height: 180.3 cm (5' 11\") Weight: 67.1 kg (147 lb 14.9 oz)  Activity: Up ad devon  Neuros: WDL ex mild anxiety  Cardiac: WDL  Respiratory: WDL. Sating well on RA, No SOB reported.   GI/: Voiding independently, No BM since procedure.  Diet: Clear liquids, tolerating fairly. May advance to regular diet once tolerates clears.  Lines: R PIV SL  Incisions/Drains: 4 lap sites from cholecystectomy.   Labs: amylase 64, lipase 393  Pain/nausea: 1mg IV dilaudid PRN q2. Scheduled tylenol. Denies nausea this shift.   Plan:   Continue plan of care.          "

## 2020-01-08 NOTE — PLAN OF CARE
"/81 (BP Location: Left arm)   Pulse 93   Temp 98.2  F (36.8  C) (Oral)   Resp 18   Ht 1.803 m (5' 11\")   Wt 67.1 kg (147 lb 14.9 oz)   SpO2 100%   BMI 20.63 kg/m      Activity: independent   Neuros: A&O x4  Cardiac:WDL, denies chest pain  Respiratory: Ls:clear bilaterally, R lower with some expiratory wheezes, denies SOB, room air, no cough  GI/: + BS, +gas, last BM 1/8, voiding adequately   Diet: Reg  Skin: 4 abdominal lap sites steri stripped, denies numbness/tingling, R PIV, pale, dry, showered  Lines:R PIV SL ex when infusing rocephin  Pain/nausea: Abdominal pain controlled with oxycodone, denies nausea  Plan:Cont POC, discharge 1/9 morning   "

## 2020-01-08 NOTE — PLAN OF CARE
"/64 (BP Location: Left arm)   Pulse 93   Temp 96.7  F (35.9  C) (Oral)   Resp 16   Ht 1.803 m (5' 11\")   Wt 67.1 kg (147 lb 14.9 oz)   SpO2 100%   BMI 20.63 kg/m      Neuro: A&Ox4; calls appropriately  Respiratory: sats 100% on RA. Denies SOB  Cardiac: WNL  GI/: +BS, +passing small amounts of gas, 2 watery BMs this shift.    Diet: Regular diet, tolerating well.   Pain/Nausea: C/O abdominal pain. Scheduled tylenol and PRN oxycodone given with some relief. Intermittent nausea this AM. PRN zofran given X1 with relief.   Incisions/Drains: None  Skin: old lap sites with adhesive strips.   IV Access: Right PIV- saline locked  Activity: Up independently  New changes this shift: no acute changes  Plan: possible discharge depending on pain control and no n/v.   "

## 2020-01-09 ENCOUNTER — CARE COORDINATION (OUTPATIENT)
Dept: GASTROENTEROLOGY | Facility: CLINIC | Age: 30
End: 2020-01-09

## 2020-01-09 ENCOUNTER — PREP FOR PROCEDURE (OUTPATIENT)
Dept: GASTROENTEROLOGY | Facility: CLINIC | Age: 30
End: 2020-01-09

## 2020-01-09 VITALS
TEMPERATURE: 96 F | BODY MASS INDEX: 20.71 KG/M2 | HEART RATE: 93 BPM | RESPIRATION RATE: 18 BRPM | SYSTOLIC BLOOD PRESSURE: 130 MMHG | DIASTOLIC BLOOD PRESSURE: 79 MMHG | OXYGEN SATURATION: 100 % | HEIGHT: 71 IN | WEIGHT: 147.93 LBS

## 2020-01-09 DIAGNOSIS — K83.9 BILE LEAK: Primary | ICD-10-CM

## 2020-01-09 LAB
ANION GAP SERPL CALCULATED.3IONS-SCNC: 3 MMOL/L (ref 3–14)
BUN SERPL-MCNC: 12 MG/DL (ref 7–30)
CALCIUM SERPL-MCNC: 8.7 MG/DL (ref 8.5–10.1)
CHLORIDE SERPL-SCNC: 105 MMOL/L (ref 94–109)
CO2 SERPL-SCNC: 30 MMOL/L (ref 20–32)
CREAT SERPL-MCNC: 0.76 MG/DL (ref 0.66–1.25)
ERYTHROCYTE [DISTWIDTH] IN BLOOD BY AUTOMATED COUNT: 11.9 % (ref 10–15)
GFR SERPL CREATININE-BSD FRML MDRD: >90 ML/MIN/{1.73_M2}
GLUCOSE SERPL-MCNC: 89 MG/DL (ref 70–99)
HCT VFR BLD AUTO: 36.1 % (ref 40–53)
HGB BLD-MCNC: 12 G/DL (ref 13.3–17.7)
LIPASE SERPL-CCNC: 104 U/L (ref 73–393)
MCH RBC QN AUTO: 31.7 PG (ref 26.5–33)
MCHC RBC AUTO-ENTMCNC: 33.2 G/DL (ref 31.5–36.5)
MCV RBC AUTO: 95 FL (ref 78–100)
PLATELET # BLD AUTO: 231 10E9/L (ref 150–450)
POTASSIUM SERPL-SCNC: 3.7 MMOL/L (ref 3.4–5.3)
RBC # BLD AUTO: 3.79 10E12/L (ref 4.4–5.9)
SODIUM SERPL-SCNC: 137 MMOL/L (ref 133–144)
WBC # BLD AUTO: 7.4 10E9/L (ref 4–11)

## 2020-01-09 PROCEDURE — 25000132 ZZH RX MED GY IP 250 OP 250 PS 637: Performed by: PATHOLOGY

## 2020-01-09 PROCEDURE — 85027 COMPLETE CBC AUTOMATED: CPT | Performed by: STUDENT IN AN ORGANIZED HEALTH CARE EDUCATION/TRAINING PROGRAM

## 2020-01-09 PROCEDURE — 25000128 H RX IP 250 OP 636: Performed by: STUDENT IN AN ORGANIZED HEALTH CARE EDUCATION/TRAINING PROGRAM

## 2020-01-09 PROCEDURE — 83690 ASSAY OF LIPASE: CPT | Performed by: STUDENT IN AN ORGANIZED HEALTH CARE EDUCATION/TRAINING PROGRAM

## 2020-01-09 PROCEDURE — 99238 HOSP IP/OBS DSCHRG MGMT 30/<: CPT | Performed by: INTERNAL MEDICINE

## 2020-01-09 PROCEDURE — 80048 BASIC METABOLIC PNL TOTAL CA: CPT | Performed by: STUDENT IN AN ORGANIZED HEALTH CARE EDUCATION/TRAINING PROGRAM

## 2020-01-09 PROCEDURE — 25000132 ZZH RX MED GY IP 250 OP 250 PS 637: Performed by: STUDENT IN AN ORGANIZED HEALTH CARE EDUCATION/TRAINING PROGRAM

## 2020-01-09 PROCEDURE — 36415 COLL VENOUS BLD VENIPUNCTURE: CPT | Performed by: STUDENT IN AN ORGANIZED HEALTH CARE EDUCATION/TRAINING PROGRAM

## 2020-01-09 RX ORDER — METRONIDAZOLE 500 MG/1
500 TABLET ORAL 3 TIMES DAILY
Qty: 18 TABLET | Refills: 0 | Status: SHIPPED | OUTPATIENT
Start: 2020-01-09 | End: 2020-02-25

## 2020-01-09 RX ORDER — OXYCODONE HYDROCHLORIDE 5 MG/1
5-10 TABLET ORAL EVERY 4 HOURS PRN
Qty: 10 TABLET | Refills: 0 | Status: SHIPPED | OUTPATIENT
Start: 2020-01-09

## 2020-01-09 RX ORDER — CIPROFLOXACIN 500 MG/1
500 TABLET, FILM COATED ORAL 2 TIMES DAILY
Qty: 12 TABLET | Refills: 0 | Status: SHIPPED | OUTPATIENT
Start: 2020-01-09 | End: 2020-02-25

## 2020-01-09 RX ORDER — ONDANSETRON 4 MG/1
4 TABLET, FILM COATED ORAL EVERY 6 HOURS PRN
Qty: 10 TABLET | Refills: 0 | Status: SHIPPED | OUTPATIENT
Start: 2020-01-09 | End: 2020-02-25

## 2020-01-09 RX ADMIN — ACETAMINOPHEN 975 MG: 325 TABLET, FILM COATED ORAL at 01:20

## 2020-01-09 RX ADMIN — ACETAMINOPHEN 975 MG: 325 TABLET, FILM COATED ORAL at 08:07

## 2020-01-09 RX ADMIN — OXYCODONE HYDROCHLORIDE 10 MG: 5 TABLET ORAL at 04:24

## 2020-01-09 RX ADMIN — NICOTINE 1 PATCH: 14 PATCH, EXTENDED RELEASE TRANSDERMAL at 08:06

## 2020-01-09 RX ADMIN — OXYCODONE HYDROCHLORIDE 10 MG: 5 TABLET ORAL at 09:44

## 2020-01-09 RX ADMIN — ONDANSETRON 4 MG: 4 TABLET, ORALLY DISINTEGRATING ORAL at 09:44

## 2020-01-09 ASSESSMENT — ACTIVITIES OF DAILY LIVING (ADL)
ADLS_ACUITY_SCORE: 10

## 2020-01-09 NOTE — PROGRESS NOTES
Received the following message to organize the following:     Could you please assist in scheduling:     Procedure/Appointment/Imaging: ERCP   Physician: Dr. Prado   Timin-8 weeks     Comments:   ERCP for biliary stent removal/exchange, pt with bile leak s/p ERCP with stent placement on 20.     Case request placed.     Patient is currently admitted so I did not call.     CECI Mc[address] Dr. Prado, & Dr. Calderon  [facility]  [phone]

## 2020-01-09 NOTE — PLAN OF CARE
"/79 (BP Location: Left arm)   Pulse 93   Temp 96  F (35.6  C) (Oral)   Resp 18   Ht 1.803 m (5' 11\")   Wt 67.1 kg (147 lb 14.9 oz)   SpO2 100%   BMI 20.63 kg/m      Neuro: A&Ox4; calls appropriately  Respiratory: sats 100% on RA. Denies SOB  Cardiac: WNL  GI/: +BS, +passing small amounts of gas, 1 small BM this shift.    Diet: Regular diet, tolerating well.   Pain/Nausea: C/O abdominal pain. Scheduled tylenol and PRN oxycodone given with some relief. Intermittent nausea this AM. PRN zofran given X1 with relief.   Incisions/Drains: None  Skin: old lap sites with adhesive strips.   IV Access: Right PIV- removed  Activity: Up independently  New changes this shift: no acute changes    Discharge paperwork was reviewed with patient. Pt expressed understanding. A copy was given to patient. Pt discharging home. Pt's mother will transport. Discharge medications available in pharmacy, pt was advised to pickup medications before they leave. All patient belongings were taken with patient.     "

## 2020-01-09 NOTE — PLAN OF CARE
"4230-1409    /72 (BP Location: Left arm)   Pulse 93   Temp 98.5  F (36.9  C) (Oral)   Resp 18   Ht 1.803 m (5' 11\")   Wt 67.1 kg (147 lb 14.9 oz)   SpO2 100%   BMI 20.63 kg/m      Activity: independent   Neuros: A&O x4  Cardiac:WDL, denies chest pain  Respiratory: Ls:clear bilaterally, denies SOB, room air, no coughing.  GI/: + BS, +gas, last BM 1/8, voiding adequately   Diet: Reg  Skin: 4 abdominal lap sites steri stripped.  Lines:R PIV SL  Pain/nausea: Abdominal pain controlled with oxycodone 10 mg x 1, denies nausea  Plan:Cont POC, discharge 1/9 morning   "

## 2020-01-12 NOTE — DISCHARGE SUMMARY
[facility], [place]  Discharge Summary - Medicine & Pediatrics       Date of Admission:  1/7/2020  Date of Discharge:  1/9/2020 12:17 PM  Discharging Provider: Marcelo Badillo MD   Discharge Service: Jose Martin Watkins    Discharge Diagnoses   Bile leak s/p elective laparoscopic cholecystectomy (12/31/19)  Sepsis 2/2 above  Acute abdominal pain, generalized, likely 2/2 peritoneal irritation from above  Anemia  Anxiety    Follow-ups Needed After Discharge   Follow-up Appointments     Adult [facility] Follow-up and recommended labs and tests      Follow up with primary care provider, Beba Cantrell, within 7 days   for hospital follow- up.  CBC for follow-up of mild anemia  Follow up with GI in 6-8 weeks for repeat ERCP with potential stent   removal vs exchange in 6-8 weeks. No follow up labs or test are needed.    Appointments on [place] and/or [facility] (with [facility] or [facility]   provider or service). Call [phone] if you haven't heard regarding   these appointments within 7 days of discharge.           Unresulted Labs Ordered in the Past 30 Days of this Admission     Date and Time Order Name Status Description    1/7/2020 0144 Blood culture Preliminary         Discharge Disposition   Discharged to home  Condition at discharge: Stable    Hospital Course   Yony Zimmer was admitted on 1/7/2020 for sepsis 2/2 bile leak following elective laparoscopic cholecystectomy.  The following problems were addressed during his hospitalization:    Yony Zimmer is a 29 year old male with PMH of anxiety who presents with acute onset abdominal pain presumed secondary to bile leak from recent laparoscopic cholecystectomy.      Bile leak s/p elective laparoscopic cholecystectomy (12/31/19)  Sepsis 2/2 above  Acute abdominal pain, generalized, likely 2/2 peritoneal irritation from above  Admitted with post-operative bile leak following elective laparoscopic cholecystectomy on 12/31/19  after presentation with acute abdominal pain, fevers/chills. Workup significant for leukocytosis, normal LFTs and lipase, and CT abdomen showing nonloculated fluid density in the gallbladder fossa and subsquent HIDA scan with findings suggestive of bile leak. GI consulted with ERCP with stenting on 1/7 that was uncomplicated. Initially treated with IV ertapenem with transition to IV ceftriaxone on 1/8. Discharged on PO ciprofloxacin and flagyl for completion of 7 day total course of antibiotics following intervention. Blood cultures negative at time of discharge. Patient counseled on potential risks of fluoroquinolones and instructed to contact PCP with concern for side effects for transition to alternative regimen.  - Follow up with GI as outpatient for repeat ERCP in 6 to 8 weeks for stent removal or exchange with leak interrogation   - Follow up with PCP    Anemia  Stable. No evidence of bleed. Likely contribution of dilution with fluid resuscitation.  - Follow up with PCP with CBC     Anxiety  - Resume PTA lexapro and hydroxyzine on discharge       Consultations This Hospital Stay   GI PANCREATICOBILIARY ADULT IP CONSULT  SURGERY GENERAL ADULT IP CONSULT  VASCULAR ACCESS CARE ADULT IP CONSULT    Code Status   Full Code       The patient was discussed with Dr. Badillo.    MD Jose Martin Bone [address] [place] [address], [place]  Pager: [phone]  ______________________________________________________________________    Physical Exam   Vital Signs:                    Weight: 147 lbs 14.86 oz  Constitutional: awake, alert, cooperative, no apparent distress, and appears stated age  Eyes: Lids and lashes normal, pupils equal, round and reactive to light, extra ocular muscles intact, sclera clear, conjunctiva normal  ENT: Normocephalic, without obvious abnormality, atraumatic, external ears without lesions  Respiratory: No increased work of breathing, good air exchange, clear to  auscultation bilaterally, no crackles or wheezing  Cardiovascular: Normal apical impulse, regular rate and rhythm, normal S1 and S2, no S3 or S4, and no murmur noted  GI: Bowel sounds present, minimally tender to palpation in epigastrium, mild guarding, no rebound  Skin: normal skin color, texture, turgor  Musculoskeletal: full range of motion noted  Neurologic: Awake, alert, oriented to name, place and time.  Cranial nerves II-XII are grossly intact. Moves all 4 extremities   Neuropsychiatric: Pleasant and cooperative      Primary Care Physician   Beba Cantrell    Discharge Orders      Reason for your hospital stay    You were hospitalized with a biliary leak associated with a recent procedure. You were seen by the GI team and a stent was placed using ERCP. You were initially treated with IV antibiotics to cover infection in the belly and your were transitioned to pill antibiotics. You will need to follow up with the GI team in 6-8 weeks for repeat ERCP.     Adult [facility] Follow-up and recommended labs and tests    Follow up with primary care provider, Beba Cantrell, within 7 days for hospital follow- up.  CBC for follow-up of mild anemia  Follow up with GI in 6-8 weeks for repeat ERCP with potential stent removal vs exchange in 6-8 weeks. No follow up labs or test are needed.    Appointments on [place] and/or [facility] (with [facility] or [facility] provider or service). Call [phone] if you haven't heard regarding these appointments within 7 days of discharge.     Activity    Your activity upon discharge: activity as tolerated     When to contact your care team    Call your primary doctor if you have any of the following: temperature greater than 100.4 or less than 96.8, increased pain. Call your doctor if you begin to have pain in your heals, chest pain, or palpitations.     Full Code     Diet    Follow this diet upon discharge: Orders Placed This Encounter      Regular Diet Adult       Significant  Results and Procedures   Most Recent 3 CBC's:  Recent Labs   Lab Test 01/09/20  0613 01/08/20  0718 01/07/20  1025   WBC 7.4 8.3 12.5*   HGB 12.0* 12.1* 13.8   MCV 95 93 95    225 249     Most Recent 3 BMP's:  Recent Labs   Lab Test 01/09/20  0613 01/08/20  0718 01/07/20  0617    137 137   POTASSIUM 3.7 3.9 3.7   CHLORIDE 105 104 105   CO2 30 28 28   BUN 12 10 13   CR 0.76 0.56* 0.73   ANIONGAP 3 5 4   MARYANA 8.7 8.7 8.7   GLC 89 98 95     Most Recent 2 LFT's:  Recent Labs   Lab Test 01/07/20  0617 01/06/20  1403   AST 18 23   ALT 30 42   ALKPHOS 48 58   BILITOTAL 1.1 0.6     Results for SAMUEL PRITCHARD (MRN [id number]) as of 1/12/2020 14:58   Ref. Range 1/6/2020 14:03 1/7/2020 20:01 1/9/2020 06:13   Lipase Latest Ref Range: 73 - 393 U/L 65 (L) 393 104      Results for orders placed or performed during the hospital encounter of 01/07/20   XR Surgery MARTY G/T 5 Min Fluoro w Stills    Narrative    This exam was marked as non-reportable because it will not be read by a   radiologist or a [place] non-radiologist provider.                   Discharge Medications   Discharge Medication List as of 1/9/2020 10:31 AM      START taking these medications    Details   ondansetron (ZOFRAN) 4 MG tablet Take 1 tablet (4 mg) by mouth every 6 hours as needed for nausea, Disp-10 tablet, R-0, E-Prescribe         CONTINUE these medications which have CHANGED    Details   ciprofloxacin (CIPRO) 500 MG tablet Take 1 tablet (500 mg) by mouth 2 times daily for 6 days, Disp-12 tablet, R-0, E-Prescribe      metroNIDAZOLE (FLAGYL) 500 MG tablet Take 1 tablet (500 mg) by mouth 3 times daily for 6 days, Disp-18 tablet, R-0, E-Prescribe      oxyCODONE (ROXICODONE) 5 MG tablet Take 1-2 tablets (5-10 mg) by mouth every 4 hours as needed for moderate to severe pain, Disp-10 tablet, R-0, Local Print         CONTINUE these medications which have NOT CHANGED    Details   escitalopram (LEXAPRO) 10 MG tablet Take 10 mg by mouth daily,  Historical      hydrOXYzine (VISTARIL) 50 MG capsule Take 1 capsule (50 mg) by mouth 3 times daily as needed For anxiety, Disp-50 capsule, R-1, E-Prescribe      oxyCODONE-acetaminophen (PERCOCET) 5-325 MG tablet Take 1-2 tablets by mouth every 6 hours as needed, Historical           Allergies   Allergies   Allergen Reactions     Amoxicillin Hives

## 2020-01-13 LAB
BACTERIA SPEC CULT: NO GROWTH
Lab: NORMAL
SPECIMEN SOURCE: NORMAL

## 2020-01-23 ENCOUNTER — TELEPHONE (OUTPATIENT)
Dept: GASTROENTEROLOGY | Facility: CLINIC | Age: 30
End: 2020-01-23

## 2020-01-23 NOTE — TELEPHONE ENCOUNTER
LVM for patient in regards to scheduled procedure with Dr. Prado. Left direct line for patient to call to go over scheduling details.     1/23/2020

## 2020-01-23 NOTE — TELEPHONE ENCOUNTER
Patient returned my phone call. Informed patient he is scheduled with Dr. Prado on 2/25/2020. Patient informed me he cannot make that work and needs to reschedule to 2/26/2020. Informed patient he will need an updated pre-op physical within 30 days of his procedure. Patient stated he is going to have this done locally. Informed patient he will need a  and someone to monitor him for 24 hours after the procedure. Informed patient all scheduling details will be sent to the address listed on Epic. Address confirmed on this call.     1/23/2020

## 2020-02-10 ENCOUNTER — HEALTH MAINTENANCE LETTER (OUTPATIENT)
Age: 30
End: 2020-02-10

## 2020-02-25 ENCOUNTER — ANESTHESIA EVENT (OUTPATIENT)
Dept: SURGERY | Facility: CLINIC | Age: 30
End: 2020-02-25
Payer: COMMERCIAL

## 2020-02-25 RX ORDER — BUSPIRONE HYDROCHLORIDE 7.5 MG/1
7.5 TABLET ORAL 2 TIMES DAILY
COMMUNITY

## 2020-02-26 ENCOUNTER — HOSPITAL ENCOUNTER (OUTPATIENT)
Facility: CLINIC | Age: 30
Discharge: HOME | End: 2020-02-26
Attending: INTERNAL MEDICINE | Admitting: INTERNAL MEDICINE
Payer: COMMERCIAL

## 2020-02-26 ENCOUNTER — APPOINTMENT (OUTPATIENT)
Dept: GENERAL RADIOLOGY | Facility: CLINIC | Age: 30
End: 2020-02-26
Attending: INTERNAL MEDICINE
Payer: COMMERCIAL

## 2020-02-26 ENCOUNTER — ANESTHESIA (OUTPATIENT)
Dept: SURGERY | Facility: CLINIC | Age: 30
End: 2020-02-26
Payer: COMMERCIAL

## 2020-02-26 VITALS
HEIGHT: 71 IN | SYSTOLIC BLOOD PRESSURE: 129 MMHG | HEART RATE: 75 BPM | RESPIRATION RATE: 14 BRPM | WEIGHT: 144.62 LBS | TEMPERATURE: 98 F | DIASTOLIC BLOOD PRESSURE: 91 MMHG | BODY MASS INDEX: 20.25 KG/M2 | OXYGEN SATURATION: 100 %

## 2020-02-26 DIAGNOSIS — K83.9 BILE LEAK: ICD-10-CM

## 2020-02-26 LAB
ALBUMIN SERPL-MCNC: 4.5 G/DL (ref 3.4–5)
ALP SERPL-CCNC: 56 U/L (ref 40–150)
ALT SERPL W P-5'-P-CCNC: 29 U/L (ref 0–70)
AMYLASE SERPL-CCNC: 48 U/L (ref 30–110)
ANION GAP SERPL CALCULATED.3IONS-SCNC: 3 MMOL/L (ref 3–14)
AST SERPL W P-5'-P-CCNC: 21 U/L (ref 0–45)
BILIRUB SERPL-MCNC: 0.4 MG/DL (ref 0.2–1.3)
BUN SERPL-MCNC: 18 MG/DL (ref 7–30)
CALCIUM SERPL-MCNC: 9.3 MG/DL (ref 8.5–10.1)
CHLORIDE SERPL-SCNC: 108 MMOL/L (ref 94–109)
CO2 SERPL-SCNC: 28 MMOL/L (ref 20–32)
CREAT SERPL-MCNC: 0.84 MG/DL (ref 0.66–1.25)
ERCP: NORMAL
ERYTHROCYTE [DISTWIDTH] IN BLOOD BY AUTOMATED COUNT: 11.9 % (ref 10–15)
GFR SERPL CREATININE-BSD FRML MDRD: >90 ML/MIN/{1.73_M2}
GLUCOSE BLDC GLUCOMTR-MCNC: 96 MG/DL (ref 70–99)
GLUCOSE SERPL-MCNC: 94 MG/DL (ref 70–99)
HCT VFR BLD AUTO: 47.1 % (ref 40–53)
HGB BLD-MCNC: 15.4 G/DL (ref 13.3–17.7)
LIPASE SERPL-CCNC: 167 U/L (ref 73–393)
MCH RBC QN AUTO: 31.6 PG (ref 26.5–33)
MCHC RBC AUTO-ENTMCNC: 32.7 G/DL (ref 31.5–36.5)
MCV RBC AUTO: 97 FL (ref 78–100)
PLATELET # BLD AUTO: 293 10E9/L (ref 150–450)
POTASSIUM SERPL-SCNC: 4.2 MMOL/L (ref 3.4–5.3)
PROT SERPL-MCNC: 7.8 G/DL (ref 6.8–8.8)
RBC # BLD AUTO: 4.87 10E12/L (ref 4.4–5.9)
SODIUM SERPL-SCNC: 139 MMOL/L (ref 133–144)
WBC # BLD AUTO: 7.6 10E9/L (ref 4–11)

## 2020-02-26 PROCEDURE — 82150 ASSAY OF AMYLASE: CPT | Performed by: INTERNAL MEDICINE

## 2020-02-26 PROCEDURE — 71000014 ZZH RECOVERY PHASE 1 LEVEL 2 FIRST HR: Performed by: INTERNAL MEDICINE

## 2020-02-26 PROCEDURE — 40000277 XR SURGERY CARM FLUORO LESS THAN 5 MIN W STILLS: Mod: TC

## 2020-02-26 PROCEDURE — 40000170 ZZH STATISTIC PRE-PROCEDURE ASSESSMENT II: Performed by: INTERNAL MEDICINE

## 2020-02-26 PROCEDURE — 85027 COMPLETE CBC AUTOMATED: CPT | Performed by: INTERNAL MEDICINE

## 2020-02-26 PROCEDURE — 36000059 ZZH SURGERY LEVEL 3 EA 15 ADDTL MIN UMMC: Performed by: INTERNAL MEDICINE

## 2020-02-26 PROCEDURE — C1726 CATH, BAL DIL, NON-VASCULAR: HCPCS | Performed by: INTERNAL MEDICINE

## 2020-02-26 PROCEDURE — 82962 GLUCOSE BLOOD TEST: CPT

## 2020-02-26 PROCEDURE — 25800030 ZZH RX IP 258 OP 636: Performed by: ANESTHESIOLOGY

## 2020-02-26 PROCEDURE — 27210794 ZZH OR GENERAL SUPPLY STERILE: Performed by: INTERNAL MEDICINE

## 2020-02-26 PROCEDURE — 83690 ASSAY OF LIPASE: CPT | Performed by: INTERNAL MEDICINE

## 2020-02-26 PROCEDURE — 25000128 H RX IP 250 OP 636: Performed by: NURSE ANESTHETIST, CERTIFIED REGISTERED

## 2020-02-26 PROCEDURE — 25000125 ZZHC RX 250: Performed by: INTERNAL MEDICINE

## 2020-02-26 PROCEDURE — 71000027 ZZH RECOVERY PHASE 2 EACH 15 MINS: Performed by: INTERNAL MEDICINE

## 2020-02-26 PROCEDURE — C1769 GUIDE WIRE: HCPCS | Performed by: INTERNAL MEDICINE

## 2020-02-26 PROCEDURE — 80053 COMPREHEN METABOLIC PANEL: CPT | Performed by: INTERNAL MEDICINE

## 2020-02-26 PROCEDURE — 36000061 ZZH SURGERY LEVEL 3 W FLUORO 1ST 30 MIN - UMMC: Performed by: INTERNAL MEDICINE

## 2020-02-26 PROCEDURE — 37000009 ZZH ANESTHESIA TECHNICAL FEE, EACH ADDTL 15 MIN: Performed by: INTERNAL MEDICINE

## 2020-02-26 PROCEDURE — 25500064 ZZH RX 255 OP 636: Performed by: INTERNAL MEDICINE

## 2020-02-26 PROCEDURE — 25000125 ZZHC RX 250: Performed by: NURSE ANESTHETIST, CERTIFIED REGISTERED

## 2020-02-26 PROCEDURE — 37000008 ZZH ANESTHESIA TECHNICAL FEE, 1ST 30 MIN: Performed by: INTERNAL MEDICINE

## 2020-02-26 PROCEDURE — 25000128 H RX IP 250 OP 636: Performed by: ANESTHESIOLOGY

## 2020-02-26 PROCEDURE — 36415 COLL VENOUS BLD VENIPUNCTURE: CPT | Performed by: INTERNAL MEDICINE

## 2020-02-26 PROCEDURE — 25000566 ZZH SEVOFLURANE, EA 15 MIN: Performed by: INTERNAL MEDICINE

## 2020-02-26 RX ORDER — LIDOCAINE 40 MG/G
CREAM TOPICAL
Status: DISCONTINUED | OUTPATIENT
Start: 2020-02-26 | End: 2020-02-26 | Stop reason: HOSPADM

## 2020-02-26 RX ORDER — NALOXONE HYDROCHLORIDE 0.4 MG/ML
.1-.4 INJECTION, SOLUTION INTRAMUSCULAR; INTRAVENOUS; SUBCUTANEOUS
Status: CANCELLED | OUTPATIENT
Start: 2020-02-26 | End: 2020-02-27

## 2020-02-26 RX ORDER — INDOMETHACIN 50 MG/1
100 SUPPOSITORY RECTAL
Status: COMPLETED | OUTPATIENT
Start: 2020-02-26 | End: 2020-02-26

## 2020-02-26 RX ORDER — NALOXONE HYDROCHLORIDE 0.4 MG/ML
.1-.4 INJECTION, SOLUTION INTRAMUSCULAR; INTRAVENOUS; SUBCUTANEOUS
Status: DISCONTINUED | OUTPATIENT
Start: 2020-02-26 | End: 2020-02-26 | Stop reason: HOSPADM

## 2020-02-26 RX ORDER — LABETALOL HYDROCHLORIDE 5 MG/ML
10 INJECTION, SOLUTION INTRAVENOUS
Status: DISCONTINUED | OUTPATIENT
Start: 2020-02-26 | End: 2020-02-26 | Stop reason: HOSPADM

## 2020-02-26 RX ORDER — SODIUM CHLORIDE, SODIUM LACTATE, POTASSIUM CHLORIDE, CALCIUM CHLORIDE 600; 310; 30; 20 MG/100ML; MG/100ML; MG/100ML; MG/100ML
INJECTION, SOLUTION INTRAVENOUS CONTINUOUS
Status: DISCONTINUED | OUTPATIENT
Start: 2020-02-26 | End: 2020-02-26 | Stop reason: HOSPADM

## 2020-02-26 RX ORDER — IOPAMIDOL 510 MG/ML
INJECTION, SOLUTION INTRAVASCULAR PRN
Status: DISCONTINUED | OUTPATIENT
Start: 2020-02-26 | End: 2020-02-26 | Stop reason: HOSPADM

## 2020-02-26 RX ORDER — FENTANYL CITRATE 50 UG/ML
25-50 INJECTION, SOLUTION INTRAMUSCULAR; INTRAVENOUS
Status: DISCONTINUED | OUTPATIENT
Start: 2020-02-26 | End: 2020-02-26 | Stop reason: HOSPADM

## 2020-02-26 RX ORDER — FENTANYL CITRATE 50 UG/ML
INJECTION, SOLUTION INTRAMUSCULAR; INTRAVENOUS PRN
Status: DISCONTINUED | OUTPATIENT
Start: 2020-02-26 | End: 2020-02-26

## 2020-02-26 RX ORDER — ONDANSETRON 2 MG/ML
INJECTION INTRAMUSCULAR; INTRAVENOUS PRN
Status: DISCONTINUED | OUTPATIENT
Start: 2020-02-26 | End: 2020-02-26

## 2020-02-26 RX ORDER — PROPOFOL 10 MG/ML
INJECTION, EMULSION INTRAVENOUS PRN
Status: DISCONTINUED | OUTPATIENT
Start: 2020-02-26 | End: 2020-02-26

## 2020-02-26 RX ORDER — HYDROMORPHONE HYDROCHLORIDE 1 MG/ML
.3-.5 INJECTION, SOLUTION INTRAMUSCULAR; INTRAVENOUS; SUBCUTANEOUS EVERY 5 MIN PRN
Status: DISCONTINUED | OUTPATIENT
Start: 2020-02-26 | End: 2020-02-26 | Stop reason: HOSPADM

## 2020-02-26 RX ORDER — ONDANSETRON 4 MG/1
4 TABLET, ORALLY DISINTEGRATING ORAL EVERY 30 MIN PRN
Status: DISCONTINUED | OUTPATIENT
Start: 2020-02-26 | End: 2020-02-26 | Stop reason: HOSPADM

## 2020-02-26 RX ORDER — FLUMAZENIL 0.1 MG/ML
0.2 INJECTION, SOLUTION INTRAVENOUS
Status: CANCELLED | OUTPATIENT
Start: 2020-02-26 | End: 2020-02-26

## 2020-02-26 RX ORDER — ONDANSETRON 2 MG/ML
4 INJECTION INTRAMUSCULAR; INTRAVENOUS EVERY 30 MIN PRN
Status: DISCONTINUED | OUTPATIENT
Start: 2020-02-26 | End: 2020-02-26 | Stop reason: HOSPADM

## 2020-02-26 RX ORDER — LIDOCAINE HYDROCHLORIDE 20 MG/ML
INJECTION, SOLUTION INFILTRATION; PERINEURAL PRN
Status: DISCONTINUED | OUTPATIENT
Start: 2020-02-26 | End: 2020-02-26

## 2020-02-26 RX ADMIN — PROPOFOL 200 MG: 10 INJECTION, EMULSION INTRAVENOUS at 08:53

## 2020-02-26 RX ADMIN — LIDOCAINE HYDROCHLORIDE 100 MG: 20 INJECTION, SOLUTION INFILTRATION; PERINEURAL at 08:53

## 2020-02-26 RX ADMIN — SODIUM CHLORIDE, POTASSIUM CHLORIDE, SODIUM LACTATE AND CALCIUM CHLORIDE: 600; 310; 30; 20 INJECTION, SOLUTION INTRAVENOUS at 08:44

## 2020-02-26 RX ADMIN — SUGAMMADEX 200 MG: 100 INJECTION, SOLUTION INTRAVENOUS at 09:33

## 2020-02-26 RX ADMIN — ONDANSETRON 4 MG: 2 INJECTION INTRAMUSCULAR; INTRAVENOUS at 09:16

## 2020-02-26 RX ADMIN — HYDROMORPHONE HYDROCHLORIDE 0.2 MG: 1 INJECTION, SOLUTION INTRAMUSCULAR; INTRAVENOUS; SUBCUTANEOUS at 10:11

## 2020-02-26 RX ADMIN — ROCURONIUM BROMIDE 50 MG: 10 INJECTION INTRAVENOUS at 08:53

## 2020-02-26 RX ADMIN — MIDAZOLAM 2 MG: 1 INJECTION INTRAMUSCULAR; INTRAVENOUS at 08:44

## 2020-02-26 RX ADMIN — FENTANYL CITRATE 100 MCG: 50 INJECTION, SOLUTION INTRAMUSCULAR; INTRAVENOUS at 08:53

## 2020-02-26 RX ADMIN — HYDROMORPHONE HYDROCHLORIDE 0.3 MG: 1 INJECTION, SOLUTION INTRAMUSCULAR; INTRAVENOUS; SUBCUTANEOUS at 10:01

## 2020-02-26 ASSESSMENT — MIFFLIN-ST. JEOR: SCORE: 1638.13

## 2020-02-26 ASSESSMENT — LIFESTYLE VARIABLES: TOBACCO_USE: 1

## 2020-02-26 NOTE — ANESTHESIA POSTPROCEDURE EVALUATION
Anesthesia POST Procedure Evaluation    Patient: Yony Zimmer   MRN:     [id number] Gender:   male   Age:    29 year old :      1990        Preoperative Diagnosis: Bile leak [K83.9]   Procedure(s):  ENDOSCOPIC RETROGRADE CHOLANGIOPANCREATOGRAPHY   Postop Comments: No value filed.     Anesthesia Type: General          Postop Pain Control: Uneventful            Sign Out: Well controlled pain   PONV: No   Neuro/Psych: Uneventful            Sign Out: Acceptable/Baseline neuro status   Airway/Respiratory: Uneventful            Sign Out: Acceptable/Baseline resp. status   CV/Hemodynamics: Uneventful            Sign Out: Acceptable CV status   Other NRE: NONE   DID A NON-ROUTINE EVENT OCCUR? No         Last Anesthesia Record Vitals:  CRNA VITALS  2020 0910 - 2020 1007      2020             Resp Rate (observed):  (!) 2          Last PACU Vitals:  Vitals Value Taken Time   /103 2020 10:03 AM   Temp 36.3  C (97.4  F) 2020 10:00 AM   Pulse 73 2020 10:03 AM   Resp 14 2020 10:00 AM   SpO2 100 % 2020 10:06 AM   Temp src     NIBP     Pulse     SpO2     Resp     Temp     Ht Rate     Temp 2     Vitals shown include unvalidated device data.      Electronically Signed By: Jesenia Vo MD, 2020, 10:07 AM

## 2020-02-26 NOTE — ANESTHESIA CARE TRANSFER NOTE
Patient: Yony Zimmer    Procedure(s):  ENDOSCOPIC RETROGRADE CHOLANGIOPANCREATOGRAPHY    Diagnosis: Bile leak [K83.9]  Diagnosis Additional Information: No value filed.    Anesthesia Type:   General     Note:  Airway :Face Mask  Patient transferred to:PACU  Comments: Awake, alert, responding appropriately. Stable, report to RN. Handoff Report: Identifed the Patient, Identified the Reponsible Provider, Reviewed the pertinent medical history, Discussed the surgical course, Reviewed Intra-OP anesthesia mangement and issues during anesthesia, Set expectations for post-procedure period and Allowed opportunity for questions and acknowledgement of understanding      Vitals: (Last set prior to Anesthesia Care Transfer)    CRNA VITALS  2/26/2020 0910 - 2/26/2020 0944      2/26/2020             Resp Rate (observed):  (!) 2                Electronically Signed By: OPAL Hart CRNA  February 26, 2020  9:44 AM

## 2020-02-26 NOTE — OP NOTE
ERCP 02/26/2020  9:08 AM [facility], [address] [place] [address] [phone]     Endoscopy Department   _______________________________________________________________________________   Patient Name: Yony Zimmer          Procedure Date: 2/26/2020 9:08 AM   MRN: [id number]                       Account Number: [id number]   YOB: 1990              Admit Type: Outpatient   Age: 29                                Gender: Male   Note Status: Finalized                Attending MD: John Prado MD   Pause for the Cause: pause for cause completed Total Sedation Time:   _______________________________________________________________________________       Procedure:           ERCP   Indications:         Follow-up of bile leak   Providers:           John Prado MD, Gregg Wood   Patient Profile:     Mr Zimmer is a 28yo with a history of a bile leak                        following cholecystectomy for whom we performed an ERCP                        with sphincterotomy and stent placement. His NALDO drain                        output resolved and the drain removed. He returns                        feeling well without complaint with unremarkable liver                        studies.   Referring MD:        Beba Cantrell   Medicines:           General Anesthesia, Indomethacin 100 mg VA   Complications:       No immediate complications.   _______________________________________________________________________________   Procedure:           Pre-Anesthesia Assessment:                        - Prior to the procedure, a History and Physical was                        performed, and patient medications and allergies were                        reviewed. The patient is competent. The risks and                        benefits of the procedure and the sedation options and                        risks were discussed with the patient. All questions                         were answered and informed consent was obtained. Patient                        identification and proposed procedure were verified by                        the nurse in the pre-procedure area. Mental Status                        Examination: alert and oriented. Airway Examination:                        Mallampati Class II (the uvula but not tonsillar pillars                        visualized). Respiratory Examination: clear to                        auscultation. CV Examination: normal. ASA Grade                        Assessment: I - A normal, healthy patient. After                        reviewing the risks and benefits, the patient was deemed                        in satisfactory condition to undergo the procedure. The                        anesthesia plan was to use general anesthesia.                        Immediately prior to administration of medications, the                        patient was re-assessed for adequacy to receive                        sedatives. The heart rate, respiratory rate, oxygen                        saturations, blood pressure, adequacy of pulmonary                        ventilation, and response to care were monitored                        throughout the procedure. The physical status of the                        patient was re-assessed after the procedure. After                        obtaining informed consent, the scope was passed under                        direct vision. Throughout the procedure, the patient's                        blood pressure, pulse, and oxygen saturations were                        monitored continuously. The duodenoscope was introduced                        through the mouth, and used to inject contrast into and                        used to inject contrast into the bile duct. The ERCP was                        accomplished without difficulty. The patient tolerated                        the procedure well.                          "                                                            Findings:        A  film of the right upper quadrant suggested spontaneous ejection        of both the biliary and pancreatic stents. Limited white light views of        the foregut confirmed spontaneous ejection of both stents and        demonstrated a patent biliary sphincterotomy. The bile duct was deeply        cannulated with the 12 mm balloon in concert with an 0.025\" Visiglide        wire. Contrast was injected and I personally interpreted the bile duct        images. Ductal flow of contrast was adequate, image quality was        excellent and contrast extended throughout the intrahepatics. The        biliary system was grossly unremarkable and decompressed without filling        defect or stenosis. Specifically, no leak was found despite injections        under pressure. The biliary tree was swept with a 12 mm balloon starting        at the left intrahepatic duct(s) and right intrahepatic duct(s). Nothing        beyond bile and contrast were recovered. The biliary system was then        irrigated with sterile water. The ventral pancreatic duct and orifice        were selectively not interrogated.                                                                                     Impression:          - Spontaneous ejection of both the pancreatic and                        biliary stents, the latter from a patent biliary                        sphincterotomy                        - Normal post cholecystectomy cholangiography without                        evidence of ongoing leak   Recommendation:      - General anesthesia recovery with probable discharge                        home this morning                        - No further advanced endoscopy to be planned at this                        juncture                        - Follow up with your established physicians as scheduled                        - The findings and recommendations were " discussed with                        the patient and their family                                                                                       electronically signed by MARIAH Prado

## 2020-02-26 NOTE — ANESTHESIA PREPROCEDURE EVALUATION
"Anesthesia Pre-Procedure Evaluation    Patient: Yony Zimmer   MRN:     [id number] Gender:   male   Age:    29 year old :      1990        Preoperative Diagnosis: Bile leak [K83.9]   Procedure(s):  ENDOSCOPIC RETROGRADE CHOLANGIOPANCREATOGRAPHY     LABS:  CBC:   Lab Results   Component Value Date    WBC 7.6 2020    WBC 7.4 2020    HGB 15.4 2020    HGB 12.0 (L) 2020    HCT 47.1 2020    HCT 36.1 (L) 2020     2020     2020     BMP:   Lab Results   Component Value Date     2020     2020    POTASSIUM 4.2 2020    POTASSIUM 3.7 2020    CHLORIDE 108 2020    CHLORIDE 105 2020    CO2 28 2020    CO2 30 2020    BUN 18 2020    BUN 12 2020    CR 0.84 2020    CR 0.76 2020    GLC 94 2020    GLC 89 2020     COAGS:   Lab Results   Component Value Date    PTT 26 2010    INR 1.05 2020     POC:   Lab Results   Component Value Date    BGM 96 2020     OTHER:   Lab Results   Component Value Date    LACT 0.8 2020    MARYANA 9.3 2020    ALBUMIN 4.5 2020    PROTTOTAL 7.8 2020    ALT 29 2020    AST 21 2020    ALKPHOS 56 2020    BILITOTAL 0.4 2020    LIPASE 167 2020    AMYLASE 48 2020        Preop Vitals    BP Readings from Last 3 Encounters:   20 (!) 122/91   20 130/79   20 127/73    Pulse Readings from Last 3 Encounters:   20 69   20 93   20 91      Resp Readings from Last 3 Encounters:   20 14   20 18   20 13    SpO2 Readings from Last 3 Encounters:   20 100%   20 100%   20 100%      Temp Readings from Last 1 Encounters:   20 36.6  C (97.8  F) (Oral)    Ht Readings from Last 1 Encounters:   20 1.803 m (5' 11\")      Wt Readings from Last 1 Encounters:   20 65.6 kg (144 lb 10 oz)    Estimated body mass index is " "20.17 kg/m  as calculated from the following:    Height as of this encounter: 1.803 m (5' 11\").    Weight as of this encounter: 65.6 kg (144 lb 10 oz).     LDA:  Peripheral IV 02/26/20 Left Upper forearm (Active)   Site Assessment WDL 2/26/2020  8:27 AM   Line Status Saline locked 2/26/2020  8:27 AM   Number of days: 0        Past Medical History:   Diagnosis Date     Migraine       Past Surgical History:   Procedure Laterality Date     ENDOSCOPIC RETROGRADE CHOLANGIOPANCREATOGRAM N/A 1/7/2020    Procedure: ENDOSCOPIC RETROGRADE CHOLANGIOPANCREATOGRAPHY, WITH BILIARY SPHINCTEROTOMY, PANCREATIC AND BILIARY STENT PLACEMENT;  Surgeon: John Prado MD;  Location: [facility] OR     NO HISTORY OF SURGERY        Allergies   Allergen Reactions     Amoxicillin Hives        Anesthesia Evaluation     . Pt has had prior anesthetic. Type: General    No history of anesthetic complications          ROS/MED HX    ENT/Pulmonary:     (+)tobacco use, Current use 0.5 packs/day  , . .    Neurologic:     (+)migraines,     Cardiovascular:  - neg cardiovascular ROS       METS/Exercise Tolerance:  >4 METS   Hematologic:  - neg hematologic  ROS       Musculoskeletal:  - neg musculoskeletal ROS       GI/Hepatic: Comment: [place] 12/31/19-->?bile leak        Renal/Genitourinary:  - ROS Renal section negative       Endo:  - neg endo ROS       Psychiatric:     (+) psychiatric history anxiety      Infectious Disease:  - neg infectious disease ROS       Malignancy:      - no malignancy   Other:                         PHYSICAL EXAM:   Mental Status/Neuro: A/A/O   Airway: Facies: Feasible  Mallampati: I  Mouth/Opening: Full  TM distance: > 6 cm  Neck ROM: Full   Respiratory: Auscultation: CTAB     Resp. Rate: Normal     Resp. Effort: Normal      CV: Rhythm: Regular  Rate: Age appropriate  Heart: Normal Sounds  Edema: None   Comments:      Dental: Normal Dentition                Assessment:   ASA SCORE: 2    H&P: History and physical reviewed and " following examination; no interval change.   Smoking Status:  Non-Smoker/Unknown   NPO Status: NPO Appropriate     Plan:   Anes. Type:  General   Pre-Medication: None   Induction:  IV (Standard)   Airway: ETT; Oral   Access/Monitoring: PIV   Maintenance: Balanced     Postop Plan:   Postop Pain: Opioids  Postop Sedation/Airway: Not planned  Disposition: Outpatient     PONV Management:   Adult Risk Factors:, Non-Smoker, Postop Opioids   Prevention: Ondansetron, Dexamethasone     CONSENT: Direct conversation   Plan and risks discussed with: Patient   Blood Products: Consent Deferred (Minimal Blood Loss)                   Jesenia Vo MD

## 2020-02-26 NOTE — DISCHARGE INSTRUCTIONS
[facility]  Same-Day Surgery   Adult Discharge Orders & Instructions     For 24 hours after surgery    1. Get plenty of rest.  A responsible adult must stay with you for at least 24 hours after you leave the hospital.   2. Do not drive or use heavy equipment.  If you have weakness or tingling, don't drive or use heavy equipment until this feeling goes away.  3. Do not drink alcohol.  4. Avoid strenuous or risky activities.  Ask for help when climbing stairs.   5. You may feel lightheaded.  IF so, sit for a few minutes before standing.  Have someone help you get up.   6. If you have nausea (feel sick to your stomach): Drink only clear liquids such as apple juice, ginger ale, broth or 7-Up.  Rest may also help.  Be sure to drink enough fluids.  Move to a regular diet as you feel able.  7. You may have a slight fever. Call the doctor if your fever is over 100 F (37.7 C) (taken under the tongue) or lasts longer than 24 hours.  8. You may have a dry mouth, a sore throat, muscle aches or trouble sleeping.  These should go away after 24 hours.  9. Do not make important or legal decisions.   Call your doctor for any of the followin.  Signs of infection (fever, growing tenderness at the surgery site, a large amount of drainage or bleeding, severe pain, foul-smelling drainage, redness, swelling).    2. It has been over 8 to 10 hours since surgery and you are still not able to urinate (pass water).    3.  Headache for over 24 hours.    To contact a doctor during clinic hours, call Dr. Prado at [phone] (clinic)    or:        After hours:  [phone] and ask for the resident on call for GI (answered 24 hours a day)      Emergency Department:    [facility]: [phone]       (TTY for hearing impaired: [phone])

## 2020-02-28 ENCOUNTER — PATIENT OUTREACH (OUTPATIENT)
Dept: GASTROENTEROLOGY | Facility: CLINIC | Age: 30
End: 2020-02-28

## 2020-02-28 NOTE — PROGRESS NOTES
Post ERCP (2/26/2020) with Dr. Prado: Follow-up    Post procedure recommendations:   No further advanced endoscopy to be planned at this    juncture     - Follow up with your established physicians as scheduled   - The findings and recommendations were discussed with   the patient and their family     Orders placed: none    Patient states; left message    Clinic contact and scheduling numbers verified for future questions/concerns.    Eloise Moss, RN Care Coordinator

## 2020-10-20 ENCOUNTER — PATIENT OUTREACH (OUTPATIENT)
Dept: GASTROENTEROLOGY | Facility: CLINIC | Age: 30
End: 2020-10-20

## 2020-10-21 NOTE — TELEPHONE ENCOUNTER
"Had bile leak after elva, 2 ERCPs after. Good until June 2020. Since June having stomach pains again, nauseated all the time, wakes up gagging. Sometimes \"gets hunger pains\" and then gets pains after he eats. The same as before he had his gallbladder removed. Pain all over, middle of stomach, to left and right of his diaphragm. Will make a list of all other symptoms to talk about with Dr. Prado tomorrow.       ML  "

## 2020-10-22 ENCOUNTER — VIRTUAL VISIT (OUTPATIENT)
Dept: GASTROENTEROLOGY | Facility: CLINIC | Age: 30
End: 2020-10-22
Payer: COMMERCIAL

## 2020-10-22 VITALS — HEIGHT: 71 IN | WEIGHT: 145 LBS | BODY MASS INDEX: 20.3 KG/M2

## 2020-10-22 DIAGNOSIS — R10.84 ABDOMINAL PAIN, GENERALIZED: ICD-10-CM

## 2020-10-22 DIAGNOSIS — K58.9 IRRITABLE BOWEL SYNDROME WITHOUT DIARRHEA: Primary | ICD-10-CM

## 2020-10-22 PROCEDURE — 99214 OFFICE O/P EST MOD 30 MIN: CPT | Mod: 95 | Performed by: INTERNAL MEDICINE

## 2020-10-22 ASSESSMENT — PAIN SCALES - GENERAL: PAINLEVEL: MILD PAIN (3)

## 2020-10-22 ASSESSMENT — MIFFLIN-ST. JEOR: SCORE: 1634.85

## 2020-10-22 NOTE — NURSING NOTE
"Chief Complaint   Patient presents with     New Patient     having recurrent abd pain, ERCP x2 after post elva bile leak       Vitals:    10/22/20 0933   Weight: 65.8 kg (145 lb)   Height: 1.803 m (5' 11\")       Body mass index is 20.22 kg/m .      Jaskaran Shah LPN                        "

## 2020-10-22 NOTE — LETTER
10/22/2020       RE: Yony Zimmer  [address]  [place] [address]     Dear Colleague,    Thank you for referring your patient, Yony Zimmer, to the [facility] [place] [facility]. Please see a copy of my visit note below.    Referring provider Beba Cantrell  Query Abdominal pain    HPI  Mr Zimmer is a 31yo gentleman with a history of cholecystectomy complicated by bile leak who underwent management by ERCP and subsequent drain placement. Within two months had the drain and biliary stent removed following demonstration of resolved leak, however he now continues to have abdominal pain. He has no interval imaging.    Since his last ERCP in February he was pain free until June when he began to have nausea and abdominal pain. The pain is diffuse involving both left and right side, flank and substernal. It occurs randomly, and is not made worse with eating. The pain varies in length, sometimes a couple hours, sometimes longer when its a dull pain. The pain is dull and can be throbbing. The chest component is worse when lying down. The pain is not related bowel movements and his bowel habits have been regular. Nothing but time improves the pain. His weight is stable and his appetite varies. He notes at times having a significant hunger even after eating a large meal. He have small amounts of vomit early in the morning, and he notes that he has an acid taste in his mouth in the morning. Prior to his cholecystectomy, he was on omeprazole and this was discontinued following the procedure. He has no had any further imaging or endoscopy since our procedure.    He notes he has anxiety and believes that its worse with increased anxiety.     Medications  Current Outpatient Medications   Medication     busPIRone (BUSPAR) 7.5 MG tablet     escitalopram (LEXAPRO) 10 MG tablet     hydrOXYzine (VISTARIL) 50 MG capsule     oxyCODONE  (ROXICODONE) 5 MG tablet     oxyCODONE-acetaminophen (PERCOCET) 5-325 MG tablet     No current facility-administered medications for this visit.      Past Medical  Problem Noted Date   Anxiety state, unspecified 10/28/2010   Heroin overdose 10/27/2010   Chemical dependency 10/27/2010   Nausea with vomiting 10/27/2010   Shakiness 10/27/2010     Bile leak 2020   Overview:     Added automatically from request for surgery [id number]     Biliary colic 2019   Overview:     Added automatically from request for surgery [id number]     Current every day smoker 10/21/2019   Tobacco abuse 2015   Anxiety 2013   Encounter for screening for cardiovascular disorders 2013   Acne 2012     Past Surgical  Surgery Date Site/Laterality Comments   CHOLECYSTECTOMY 2019 - 2019   ended up with a bile leak post op, hospitalized in the [facility] for the leak.     ERCP 2020 - 2020   hospitalized with a bile leak 2020       Social History  Current Every Day Smoker Cigarettes 0.5       Smokeless Tobacco: Current User Chew         Tobacco Cessation: Ready to Quit: No; Counseling Given: Yes     Sex Assigned at Birth Date Recorded   Not on file       Family History    Medical History Relation Name Comments   Cancer (other) Father   GIST, surgery at [place], on daily chemo   Rheumatoid Arthritis Father       Other Mother   smoker   Parkinson's Disease Paternal Grandfather       Lung Cancer Paternal Grandmother   passed in mid60's, lung cancer     Relation Name Status Comments   Father   Alive     Half-Brother   Alive     Half-Sister   Alive     Maternal Grandfather   Alive     Maternal Grandmother   Alive     Mother   Alive     Paternal Grandfather        Paternal Grandmother        Sister   Alive       Objective:  Reported vitals:  There were no vitals taken for this visit.   GEN: Healthy, alert and no distress though anxious  PSYCH: Alert and oriented times 3; coherent speech, normal    rate and volume, able to articulate logical thoughts, able   to abstract reason, no tangential thoughts, no hallucinations   or delusions, affect seems normal  RESP: No cough, no audible wheezing, able to talk in full sentences  Remainder of exam unable to be completed due to virtual visit     Outside Imaging summaries:    Assessment and plan:  Mr Zimmer is a 29yo gentleman with abdominal pain that I suspect to be multifactorial. Components of his presentation are consistent with reflux and therefore we will reinitiate his omeprazole. Given the severity of his symptoms, we will start with two 20mg tabs with dinner to address his late night substernal pain and perhaps his morning nausea. The abdominal pain may be irritable bowel, in particular given his association with anxiety. For this, I will initiate low dose hyoscyamine and titrate to effect. If not efficacious, we will stop this mediation. He is already taking reasonable medications for his anxiety that may also provide some relief of his abdominal pain and therefore no changes tot these are recommended. Lastly, I would like to exclude any anatomic defect, such as perhaps a small ongoing leak that has accumulated and is not agitating his abdomen. Therefore, we will organize a contrast CT of the abdomen to further evaluate. We will also organize a bacterial overgrowth breath test to exclude this diagnosis. If he continues to have chest pain despite the omeprazole we will refer him to cardiology to exclude the unlikely possibility of a cardiac etiology.    It was a pleasure to participate in the care of this patient; please contact us with any further questions.  A total of 45 minutes was spent in evaluation with this patient, >50% of which was counseling regarding the above delineated issues.    Again, thank you for allowing me to participate in the care of your patient.  Sincerely,    John Prado MD PhD NURA JOE  Director of Endoscopy  Associate  Professor of Medicine, Surgery and Pediatrics  Interventional and Therapeutic Endoscopy    [facility]  Division of Gastroenterology and Hepatology  [facility] [address], [place] [address]    New Consultations [phone]  Procedure Scheduling [phone]  Clinical Nurse Coordinator [phone]  Clinical Fax   [phone]  Administrative  [phone]  Administrative Fax  [phone]

## 2020-10-22 NOTE — PROGRESS NOTES
"The patient has been notified of following:     \"This video visit will be conducted via a call between you and your physician/provider. We have found that certain health care needs can be provided without the need for an in-person physical exam.  This service lets us provide the care you need with a video conversation.  If a prescription is necessary we can send it directly to your pharmacy.  If lab work is needed we can place an order for that and you can then stop by our lab to have the test done at a later time.    Video visits are billed at different rates depending on your insurance coverage.  Please reach out to your insurance provider with any questions.    If during the course of the call the physician/provider feels a video visit is not appropriate, you will not be charged for this service.\"    Patient has given verbal consent for Video visit? Yes  How would you like to obtain your AVS? My Chart  If you are dropped from the video visit, the video invite should be resent to: Cell phone  Will anyone else be joining your video visit? No  {If patient encounters technical issues they should call [phone]     Video-Visit Details    Type of service:  Video Visit    Video Start Time: 0945  Video End Time: 1015    Originating Location (pt. Location): Home    Distant Location (provider location):  [facility] [place]     Platform used for Video Visit: [facility]    Referring provider Beba Cantrell  Query Abdominal pain    HPI  Mr Zimmer is a 31yo gentleman with a history of cholecystectomy complicated by bile leak who underwent management by ERCP and subsequent drain placement. Within two months had the drain and biliary stent removed following demonstration of resolved leak, however he now continues to have abdominal pain. He has no interval imaging.    Since his last ERCP in February he was pain free until June when he began to have nausea and abdominal pain. The pain is " diffuse involving both left and right side, flank and substernal. It occurs randomly, and is not made worse with eating. The pain varies in length, sometimes a couple hours, sometimes longer when its a dull pain. The pain is dull and can be throbbing. The chest component is worse when lying down. The pain is not related bowel movements and his bowel habits have been regular. Nothing but time improves the pain. His weight is stable and his appetite varies. He notes at times having a significant hunger even after eating a large meal. He have small amounts of vomit early in the morning, and he notes that he has an acid taste in his mouth in the morning. Prior to his cholecystectomy, he was on omeprazole and this was discontinued following the procedure. He has no had any further imaging or endoscopy since our procedure.    He notes he has anxiety and believes that its worse with increased anxiety.     Medications  Current Outpatient Medications   Medication     busPIRone (BUSPAR) 7.5 MG tablet     escitalopram (LEXAPRO) 10 MG tablet     hydrOXYzine (VISTARIL) 50 MG capsule     oxyCODONE (ROXICODONE) 5 MG tablet     oxyCODONE-acetaminophen (PERCOCET) 5-325 MG tablet     No current facility-administered medications for this visit.      Past Medical  Problem Noted Date   Anxiety state, unspecified 10/28/2010   Heroin overdose 10/27/2010   Chemical dependency 10/27/2010   Nausea with vomiting 10/27/2010   Shakiness 10/27/2010     Bile leak 01/06/2020   Overview:     Added automatically from request for surgery [id number]     Biliary colic 12/19/2019   Overview:     Added automatically from request for surgery [id number]     Current every day smoker 10/21/2019   Tobacco abuse 08/24/2015   Anxiety 12/03/2013   Encounter for screening for cardiovascular disorders 11/29/2013   Acne 03/12/2012     Past Surgical  Surgery Date Site/Laterality Comments   CHOLECYSTECTOMY 12/1/2019 - 12/31/2019   ended up with a bile leak post op,  hospitalized in the [facility] for the leak.     ERCP 2020 - 2020   hospitalized with a bile leak 2020       Social History  Current Every Day Smoker Cigarettes 0.5       Smokeless Tobacco: Current User Chew         Tobacco Cessation: Ready to Quit: No; Counseling Given: Yes     Sex Assigned at Birth Date Recorded   Not on file       Family History    Medical History Relation Name Comments   Cancer (other) Father   GIST, surgery at [place], on daily chemo   Rheumatoid Arthritis Father       Other Mother   smoker   Parkinson's Disease Paternal Grandfather       Lung Cancer Paternal Grandmother   passed in mid60's, lung cancer     Relation Name Status Comments   Father   Alive     Half-Brother   Alive     Half-Sister   Alive     Maternal Grandfather   Alive     Maternal Grandmother   Alive     Mother   Alive     Paternal Grandfather        Paternal Grandmother        Sister   Alive         Objective:  Reported vitals:  There were no vitals taken for this visit.   GEN: Healthy, alert and no distress though anxious  PSYCH: Alert and oriented times 3; coherent speech, normal   rate and volume, able to articulate logical thoughts, able   to abstract reason, no tangential thoughts, no hallucinations   or delusions, affect seems normal  RESP: No cough, no audible wheezing, able to talk in full sentences  Remainder of exam unable to be completed due to virtual visit     Outside Imaging summaries:    Assessment and plan:  Mr Zimmer is a 31yo gentleman with abdominal pain that I suspect to be multifactorial. Components of his presentation are consistent with reflux and therefore we will reinitiate his omeprazole. Given the severity of his symptoms, we will start with two 20mg tabs with dinner to address his late night substernal pain and perhaps his morning nausea. The abdominal pain may be irritable bowel, in particular given his association with anxiety. For this, I will initiate low dose hyoscyamine  and titrate to effect. If not efficacious, we will stop this mediation. He is already taking reasonable medications for his anxiety that may also provide some relief of his abdominal pain and therefore no changes tot these are recommended. Lastly, I would like to exclude any anatomic defect, such as perhaps a small ongoing leak that has accumulated and is not agitating his abdomen. Therefore, we will organize a contrast CT of the abdomen to further evaluate. We will also organize a bacterial overgrowth breath test to exclude this diagnosis. If he continues to have chest pain despite the omeprazole we will refer him to cardiology to exclude the unlikely possibility of a cardiac etiology.    It was a pleasure to participate in the care of this patient; please contact us with any further questions.  A total of 45 minutes was spent in evaluation with this patient, >50% of which was counseling regarding the above delineated issues.    John Prado MD PhD FACG VERO JOE  Director of Endoscopy  Associate Professor of Medicine, Surgery and Pediatrics  Interventional and Therapeutic Endoscopy    [facility]  Division of Gastroenterology and Hepatology  [facility] [address] [place], [place] [address]    New Consultations  [phone]  Procedure Scheduling [phone]  Clinical Nurse Coordinator [phone]  Clinical Fax   [phone]  Administrative   [phone]  Administrative Fax  [phone]

## 2020-10-22 NOTE — LETTER
"    10/22/2020         RE: Yony Zimmer  [address]  [place] [address]        Dear Colleague,    Thank you for referring your patient, Yony Zimmer, to the [facility]. Please see a copy of my visit note below.    The patient has been notified of following:     \"This video visit will be conducted via a call between you and your physician/provider. We have found that certain health care needs can be provided without the need for an in-person physical exam.  This service lets us provide the care you need with a video conversation.  If a prescription is necessary we can send it directly to your pharmacy.  If lab work is needed we can place an order for that and you can then stop by our lab to have the test done at a later time.    Video visits are billed at different rates depending on your insurance coverage.  Please reach out to your insurance provider with any questions.    If during the course of the call the physician/provider feels a video visit is not appropriate, you will not be charged for this service.\"    Patient has given verbal consent for Video visit? Yes  How would you like to obtain your AVS? My Chart  If you are dropped from the video visit, the video invite should be resent to: Cell phone  Will anyone else be joining your video visit? No  {If patient encounters technical issues they should call [phone]     Video-Visit Details    Type of service:  Video Visit    Video Start Time: 0945  Video End Time: 1015    Originating Location (pt. Location): Home    Distant Location (provider location):  [facility]     Platform used for Video Visit: [facility]    Referring provider Beba Cantrell  Query Abdominal pain    HPI  Mr Zimmer is a 29yo gentleman with a history of cholecystectomy complicated by bile leak who underwent management by ERCP and subsequent drain placement. Within two months had the " drain and biliary stent removed following demonstration of resolved leak, however he now continues to have abdominal pain. He has no interval imaging.    Since his last ERCP in February he was pain free until June when he began to have nausea and abdominal pain. The pain is diffuse involving both left and right side, flank and substernal. It occurs randomly, and is not made worse with eating. The pain varies in length, sometimes a couple hours, sometimes longer when its a dull pain. The pain is dull and can be throbbing. The chest component is worse when lying down. The pain is not related bowel movements and his bowel habits have been regular. Nothing but time improves the pain. His weight is stable and his appetite varies. He notes at times having a significant hunger even after eating a large meal. He have small amounts of vomit early in the morning, and he notes that he has an acid taste in his mouth in the morning. Prior to his cholecystectomy, he was on omeprazole and this was discontinued following the procedure. He has no had any further imaging or endoscopy since our procedure.    He notes he has anxiety and believes that its worse with increased anxiety.     Medications  Current Outpatient Medications   Medication     busPIRone (BUSPAR) 7.5 MG tablet     escitalopram (LEXAPRO) 10 MG tablet     hydrOXYzine (VISTARIL) 50 MG capsule     oxyCODONE (ROXICODONE) 5 MG tablet     oxyCODONE-acetaminophen (PERCOCET) 5-325 MG tablet     No current facility-administered medications for this visit.      Past Medical  Problem Noted Date   Anxiety state, unspecified 10/28/2010   Heroin overdose 10/27/2010   Chemical dependency 10/27/2010   Nausea with vomiting 10/27/2010   Shakiness 10/27/2010     Bile leak 01/06/2020   Overview:     Added automatically from request for surgery [id number]     Biliary colic 12/19/2019   Overview:     Added automatically from request for surgery [id number]     Current every day smoker 10/21/2019    Tobacco abuse 2015   Anxiety 2013   Encounter for screening for cardiovascular disorders 2013   Acne 2012     Past Surgical  Surgery Date Site/Laterality Comments   CHOLECYSTECTOMY 2019 - 2019   ended up with a bile leak post op, hospitalized in the [facility] for the leak.     ERCP 2020 - 2020   hospitalized with a bile leak 2020       Social History  Current Every Day Smoker Cigarettes 0.5       Smokeless Tobacco: Current User Chew         Tobacco Cessation: Ready to Quit: No; Counseling Given: Yes     Sex Assigned at Birth Date Recorded   Not on file       Family History    Medical History Relation Name Comments   Cancer (other) Father   GIST, surgery at [place], on daily chemo   Rheumatoid Arthritis Father       Other Mother   smoker   Parkinson's Disease Paternal Grandfather       Lung Cancer Paternal Grandmother   passed in mid60's, lung cancer     Relation Name Status Comments   Father   Alive     Half-Brother   Alive     Half-Sister   Alive     Maternal Grandfather   Alive     Maternal Grandmother   Alive     Mother   Alive     Paternal Grandfather        Paternal Grandmother        Sister   Alive         Objective:  Reported vitals:  There were no vitals taken for this visit.   GEN: Healthy, alert and no distress though anxious  PSYCH: Alert and oriented times 3; coherent speech, normal   rate and volume, able to articulate logical thoughts, able   to abstract reason, no tangential thoughts, no hallucinations   or delusions, affect seems normal  RESP: No cough, no audible wheezing, able to talk in full sentences  Remainder of exam unable to be completed due to virtual visit     Outside Imaging summaries:    Assessment and plan:  Mr Zimmer is a 31yo gentleman with abdominal pain that I suspect to be multifactorial. Components of his presentation are consistent with reflux and therefore we will reinitiate his omeprazole. Given the severity of his  symptoms, we will start with two 20mg tabs with dinner to address his late night substernal pain and perhaps his morning nausea. The abdominal pain may be irritable bowel, in particular given his association with anxiety. For this, I will initiate low dose hyoscyamine and titrate to effect. If not efficacious, we will stop this mediation. He is already taking reasonable medications for his anxiety that may also provide some relief of his abdominal pain and therefore no changes tot these are recommended. Lastly, I would like to exclude any anatomic defect, such as perhaps a small ongoing leak that has accumulated and is not agitating his abdomen. Therefore, we will organize a contrast CT of the abdomen to further evaluate. We will also organize a bacterial overgrowth breath test to exclude this diagnosis. If he continues to have chest pain despite the omeprazole we will refer him to cardiology to exclude the unlikely possibility of a cardiac etiology.    It was a pleasure to participate in the care of this patient; please contact us with any further questions.  A total of 45 minutes was spent in evaluation with this patient, >50% of which was counseling regarding the above delineated issues.    John Prado MD PhD FACG VERO JOE  Director of Endoscopy  Associate Professor of Medicine, Surgery and Pediatrics  Interventional and Therapeutic Endoscopy    [facility]  Division of Gastroenterology and Hepatology  [facility] [address] [id number]    New Consultations  [phone]  Procedure Scheduling [phone]  Clinical Nurse Coordinator [phone]  Clinical Fax   [phone]  Administrative   [phone]  Administrative Fax  [phone]                Again, thank you for allowing me to participate in the care of your patient.        Sincerely,        John Prado MD

## 2020-10-23 ENCOUNTER — TELEPHONE (OUTPATIENT)
Dept: GASTROENTEROLOGY | Facility: CLINIC | Age: 30
End: 2020-10-23

## 2020-10-23 NOTE — PATIENT INSTRUCTIONS
Follow up:    Dr. Prado has outlined the following steps after your recent clinic visit:    1. Start taking Omeprazole, 40mg (2 20mg tabs) with dinner. A prior authorization has been sent to your insurance company for this medication.      2.  Take Hyoscyamine, up to 4 times daily. If it doesn't help, we will stop this mediation.     3. CT abdomen. This can be done at [facility], you can call to schedule.    4. Test for bacterial overgrowth breath test. Scheduling will reach out to organize this test. You can call to schedule at [phone]    Please call with any questions or concerns regarding your clinic visit today.    It is a pleasure being involved in your health care.    Contacts post-consultation depending on your need:    Schedule Clinic Appointments            [phone] # 1   M-F 7:30 - 5 pm    Eloise Moss RN Care Coordinator  [phone]     OR Procedure Scheduling                             [phone]    My Chart is available 24 hours a day and is a secure way to access your records and communicate with your care team.  I strongly recommend signing up if you haven't already done so, if you are comfortable with computers.  If you would like to inquire about this or are having problems with My Chart access, you may call [phone] or go online at [email or web address].  Please allow at least 24 hours for a response and extra time on weekends and Holidays.

## 2020-10-23 NOTE — TELEPHONE ENCOUNTER
Prior Authorization Retail Medication Request    Medication/Dose: omeprazole, 40mg daily  ICD code (if different than what is on RX):  K21.9  Previously Tried and Failed:  New therapy  Rationale:  complaints of severe substernal pain and nausea    Insurance Name:  [employer] [facility]  Insurance ID:  [id number]       Pharmacy Information (if different than what is on RX)  Name:  [facility] #[id number] [address] AT [address]

## 2020-10-23 NOTE — TELEPHONE ENCOUNTER
Central Prior Authorization Team   Phone: [phone]      PA Initiation    Medication: omeprazole, 40mg daily  Insurance Company: [employer] [place] - Phone [phone] Fax [phone]  Pharmacy Filling the Rx: [facility] #[id number] [place], [place] - [address] [place] [address] AT [address]  Filling Pharmacy Phone:    Filling Pharmacy Fax:    Start Date: 10/23/2020    '

## 2020-10-26 DIAGNOSIS — Z11.59 ENCOUNTER FOR SCREENING FOR OTHER VIRAL DISEASES: Primary | ICD-10-CM

## 2020-10-27 NOTE — TELEPHONE ENCOUNTER
Quantity Limit is denied for 2/day dosing, patient is able to get up to 1 capsule per day    Medication: omeprazole, 40mg daily    Denial Date: 10/26/2020    Denial Rational:           Appeal Information:

## 2020-11-16 DIAGNOSIS — Z11.59 ENCOUNTER FOR SCREENING FOR OTHER VIRAL DISEASES: ICD-10-CM

## 2020-11-16 PROCEDURE — U0003 INFECTIOUS AGENT DETECTION BY NUCLEIC ACID (DNA OR RNA); SEVERE ACUTE RESPIRATORY SYNDROME CORONAVIRUS 2 (SARS-COV-2) (CORONAVIRUS DISEASE [COVID-19]), AMPLIFIED PROBE TECHNIQUE, MAKING USE OF HIGH THROUGHPUT TECHNOLOGIES AS DESCRIBED BY CMS-2020-01-R: HCPCS | Mod: 90 | Performed by: PATHOLOGY

## 2020-11-16 PROCEDURE — 99000 SPECIMEN HANDLING OFFICE-LAB: CPT | Performed by: PATHOLOGY

## 2020-11-17 LAB
SARS-COV-2 RNA SPEC QL NAA+PROBE: NOT DETECTED
SPECIMEN SOURCE: NORMAL

## 2020-11-20 ENCOUNTER — OFFICE VISIT (OUTPATIENT)
Dept: GASTROENTEROLOGY | Facility: CLINIC | Age: 30
End: 2020-11-20
Payer: COMMERCIAL

## 2020-11-20 ENCOUNTER — ANCILLARY PROCEDURE (OUTPATIENT)
Dept: CT IMAGING | Facility: CLINIC | Age: 30
End: 2020-11-20
Attending: INTERNAL MEDICINE
Payer: COMMERCIAL

## 2020-11-20 VITALS
TEMPERATURE: 97.9 F | HEART RATE: 73 BPM | DIASTOLIC BLOOD PRESSURE: 80 MMHG | BODY MASS INDEX: 20.3 KG/M2 | HEIGHT: 71 IN | SYSTOLIC BLOOD PRESSURE: 130 MMHG | RESPIRATION RATE: 16 BRPM | WEIGHT: 145 LBS | OXYGEN SATURATION: 100 %

## 2020-11-20 DIAGNOSIS — R10.84 ABDOMINAL PAIN, GENERALIZED: ICD-10-CM

## 2020-11-20 PROCEDURE — 74177 CT ABD & PELVIS W/CONTRAST: CPT | Performed by: RADIOLOGY

## 2020-11-20 PROCEDURE — 91065 BREATH HYDROGEN/METHANE TEST: CPT

## 2020-11-20 RX ORDER — IOPAMIDOL 755 MG/ML
89 INJECTION, SOLUTION INTRAVASCULAR ONCE
Status: COMPLETED | OUTPATIENT
Start: 2020-11-20 | End: 2020-11-20

## 2020-11-20 RX ADMIN — IOPAMIDOL 89 ML: 755 INJECTION, SOLUTION INTRAVASCULAR at 07:33

## 2020-11-20 ASSESSMENT — PAIN SCALES - GENERAL: PAINLEVEL: MILD PAIN (2)

## 2020-11-20 ASSESSMENT — MIFFLIN-ST. JEOR: SCORE: 1634.85

## 2020-11-20 NOTE — PATIENT INSTRUCTIONS
Hydrogen Breath Test  1. You may experience diarrhea for the next 12-24 hours.  2. Resume a regular diet.  3. Follow-up with your referring doctor in clinic.  4. If you have questions call [phone] from 7:00am-5:00pm.

## 2020-11-20 NOTE — PROGRESS NOTES
"Non-Invasive GI Procedure Visit    Yony Zimmer is a 30 year old male with history of Abdominal pain, generalized.   Patient stated reason for procedure: Abdominal Pain  COVID-19 Test Performed within 48-72hrs of the procedure:  Yes. COVID-19 result was NEGATIVE.    COVID-19 PCR Results    COVID-19 PCR Results 11/16/20   COVID-19 Virus PCR to [facility] - Result Not Detected   COVID-19 Virus PCR to [facility] - Source Nasopharyngeal      Comments are available for some flowsheets but are not being displayed.         COVID-19 Antibody Results, Testing for Immunity    COVID-19 Antibody Results, Testing for Immunity   No data to display.             Pre-Procedure Assessment  Patient presents to clinic today for Hydrogen Breath Test    Referring Provider: Dr Prado  Previous HBT: No  Is patient a smoker: Yes, When was your last cigarette? 645 am 11/20  Does patient report taking antibiotic or colon prep within the last 2 weeks? No.  Does patient report taking a stool softener, fiber supplement, laxative or bowel prep  in the week? No.  Does the patient take a probiotic? No  Does the patient follow a bland diet? Yes  Does patient report being NPO for a minimum of 12 hours before the test? Yes.   Patient reported symptoms:Abdominal Pain  How long has patient had these symptoms? 6 months        .    Patient Hx  Patient's history, medications and allergies were reviewed.     Height: 5' 11\"   Weight: 145 lbs 0 oz    Patient Active Problem List    Diagnosis Date Noted     Abdominal pain 01/07/2020     Priority: Medium     Bile leak 01/06/2020     Priority: Medium     Added automatically from request for surgery [id number]       Tobacco abuse 08/24/2015     Priority: Medium     Anxiety 12/03/2013     Priority: Medium     CARDIOVASCULAR SCREENING; LDL GOAL LESS THAN 160 11/29/2013     Priority: Medium     Acne 03/12/2012     Priority: Medium      Prior to Admission medications    Medication Sig Start Date End Date Taking? " Authorizing Provider   busPIRone (BUSPAR) 7.5 MG tablet Take 7.5 mg by mouth 2 times daily    Reported, Patient   escitalopram (LEXAPRO) 10 MG tablet Take 10 mg by mouth daily 12/6/19   Reported, Patient   hydrOXYzine (VISTARIL) 50 MG capsule Take 1 capsule (50 mg) by mouth 3 times daily as needed For anxiety  Patient not taking: Reported on 10/22/2020 8/24/15   Donny Mcgovern MD   hyoscyamine SL (LEVSIN/SL) 0.125 MG sublingual tablet Take 1 tablet (0.125 mg) by mouth 4 times daily (before meals and nightly) 10/22/20   John Prado MD   oxyCODONE (ROXICODONE) 5 MG tablet Take 1-2 tablets (5-10 mg) by mouth every 4 hours as needed for moderate to severe pain  Patient not taking: Reported on 2/25/2020 1/9/20   Marcelo Badillo MD   oxyCODONE-acetaminophen (PERCOCET) 5-325 MG tablet Take 1-2 tablets by mouth every 6 hours as needed 12/31/19   Reported, Patient     Allergies   Allergen Reactions     Amoxicillin Hives     Past Medical History:   Diagnosis Date     Migraine      Past Surgical History:   Procedure Laterality Date     ENDOSCOPIC RETROGRADE CHOLANGIOPANCREATOGRAM N/A 1/7/2020    Procedure: ENDOSCOPIC RETROGRADE CHOLANGIOPANCREATOGRAPHY, WITH BILIARY SPHINCTEROTOMY, PANCREATIC AND BILIARY STENT PLACEMENT;  Surgeon: John Prado MD;  Location:  OR     ENDOSCOPIC RETROGRADE CHOLANGIOPANCREATOGRAM N/A 2/26/2020    Procedure: ENDOSCOPIC RETROGRADE CHOLANGIOPANCREATOGRAPHY;  Surgeon: John Prado MD;  Location:  OR     NO HISTORY OF SURGERY       Family History   Problem Relation Age of Onset     Arthritis Father         rhuematoid arthritis     Cancer Father         GI     Heart Disease Maternal Grandfather      Cancer Paternal Grandmother         lung cancer     Neurologic Disorder Paternal Grandfather         parkinsons     Anxiety Disorder Brother      Social History     Tobacco Use     Smoking status: Current Every Day Smoker     Packs/day: 0.25     Years: 7.00      Pack years: 1.75     Types: Cigarettes     Smokeless tobacco: Never Used   Substance Use Topics     Alcohol use: Yes     Comment: 1-2 x week,2-3drinks        Pre-Procedure Education & Consent  Procedure education was provided to: Patient  Teaching method: Explanation  Barriers to learning: No Barrier    Patient indicated understanding of pre-procedure instruction and appropriate consent was obtained and documented.    Procedure Documentation: Hydrogen Breath Test     Baseline breath obtained prior to patient drinking Lactulose.     Patient tolerated test with abdominal pain until 0920.      HBT Results    Sample Clock Times Ppm H2 Ppm CH4 CO2% Comments   Baseline 0834 1 0 4.2 Abdominal Pain   Challenge Dose Given 0840 - - - -   #1 0900 4 0 4.0     #2 0920 3 0 4.0     #3 0940 1 0 4.1     #4 1000 1 0 4.2     #5 1020 7 1 4.2     #6 1040 12 1 4.2     #7 1100 15 1 4.0     #8 1120 28 1 3.9     #9 1140 33 2 4.6       #  B   Patient given discharge instructions.    Notification of pending test results sent to provider for interpretation. Please reference scanned document for final interpretation of results. Patient will follow up with referring provider for test results.    Soumya Verde RN on 11/20/2020 at 8:19 AM

## 2020-11-30 ENCOUNTER — PATIENT OUTREACH (OUTPATIENT)
Dept: GASTROENTEROLOGY | Facility: CLINIC | Age: 30
End: 2020-11-30

## 2020-11-30 NOTE — TELEPHONE ENCOUNTER
Called to f/ up on Dr. Prado's letter     We are writing to inform you of your test results. In short, great news. The CT of your abdomen was without any concerning finding. We will therefore continue to direct therapy towards suspected reflux and irritable bowel disease.     Orders for meds and SIBO testing already placed. Patient has not been taking PPI as it was not covered. Has gotten other letters about not covering other medication. Encouraged him to reach out to insurance company to find out why not covered and let us know. SIBO testing done 11/20, results not available yet.     Eloise Moss, [facility]IA[facility] Care Coordinator

## 2021-04-03 ENCOUNTER — HEALTH MAINTENANCE LETTER (OUTPATIENT)
Age: 31
End: 2021-04-03

## 2021-09-18 ENCOUNTER — HEALTH MAINTENANCE LETTER (OUTPATIENT)
Age: 31
End: 2021-09-18

## 2022-04-24 ENCOUNTER — HEALTH MAINTENANCE LETTER (OUTPATIENT)
Age: 32
End: 2022-04-24

## 2022-11-19 ENCOUNTER — HEALTH MAINTENANCE LETTER (OUTPATIENT)
Age: 32
End: 2022-11-19

## 2023-06-01 ENCOUNTER — HEALTH MAINTENANCE LETTER (OUTPATIENT)
Age: 33
End: 2023-06-01

## (undated) DEVICE — ENDO BITE BLOCK ADULT OMNI-BLOC

## (undated) DEVICE — ENDO DEVICE LOCKING AND BIOPSY CAP M00545261

## (undated) DEVICE — ENDO SNARE POLYPECTOMY OVAL 15MM LOOP SD-240U-15

## (undated) DEVICE — ENDO TUBING CO2 SMARTCAP STERILE DISP 100145CO2EXT

## (undated) DEVICE — SOL WATER IRRIG 1000ML BOTTLE 2F7114

## (undated) DEVICE — PACK ENDOSCOPY GI CUSTOM UMMC

## (undated) DEVICE — CATH RETRIEVAL BALLOON EXTRACTOR PRO RX-S INJ ABOVE 9-12MM

## (undated) DEVICE — WIRE GUIDE 0.025"X270CM SHORT STR VISIGLIDE 2 G-260-2527S

## (undated) DEVICE — BIOPSY VALVE BIOSHIELD 00711135

## (undated) DEVICE — ESU GROUND PAD ADULT W/CORD E7507

## (undated) DEVICE — ENDO FUSION OMNI-TOME G31903

## (undated) DEVICE — WIRE GUIDE 0.025"X270CM ANG VISIGLIDE G-240-2527A

## (undated) DEVICE — WIRE GUIDE 0.025"X270CM SHORT ANG VISIGLIDE 2 G-260-2527A

## (undated) DEVICE — SUCTION MANIFOLD DORNOCH ULTRA CART UL-CL500

## (undated) DEVICE — STENT ZIMMON PANCREA 7FRX07CM SGL PIGTAIL G22353 SPSOF-7-7
Type: IMPLANTABLE DEVICE | Site: BILE DUCT | Status: NON-FUNCTIONAL
Removed: 2020-01-07

## (undated) DEVICE — KIT CONNECTOR FOR OLYMPUS ENDOSCOPES DEFENDO 100310

## (undated) DEVICE — WIRE GUIDE 0.025"X270CM STR VISIGLIDE G-240-2527S

## (undated) DEVICE — KIT ENDO FIRST STEP DISINFECTANT 200ML W/POUCH EP-4

## (undated) DEVICE — GUIDEWIRE NOVAGOLD .018X260CM STR TIP M00552000

## (undated) DEVICE — ENDO FUSION OMNI-TOME 21 FS-OMNI-21 G48675

## (undated) RX ORDER — LIDOCAINE HYDROCHLORIDE 20 MG/ML
INJECTION, SOLUTION EPIDURAL; INFILTRATION; INTRACAUDAL; PERINEURAL
Status: DISPENSED
Start: 2020-01-07

## (undated) RX ORDER — FENTANYL CITRATE 50 UG/ML
INJECTION, SOLUTION INTRAMUSCULAR; INTRAVENOUS
Status: DISPENSED
Start: 2020-02-26

## (undated) RX ORDER — ONDANSETRON 2 MG/ML
INJECTION INTRAMUSCULAR; INTRAVENOUS
Status: DISPENSED
Start: 2020-01-07

## (undated) RX ORDER — PHENYLEPHRINE HCL IN 0.9% NACL 1 MG/10 ML
SYRINGE (ML) INTRAVENOUS
Status: DISPENSED
Start: 2020-02-26

## (undated) RX ORDER — LIDOCAINE HYDROCHLORIDE 20 MG/ML
INJECTION, SOLUTION EPIDURAL; INFILTRATION; INTRACAUDAL; PERINEURAL
Status: DISPENSED
Start: 2020-02-26

## (undated) RX ORDER — HYDROMORPHONE HYDROCHLORIDE 1 MG/ML
INJECTION, SOLUTION INTRAMUSCULAR; INTRAVENOUS; SUBCUTANEOUS
Status: DISPENSED
Start: 2020-02-26

## (undated) RX ORDER — ONDANSETRON 2 MG/ML
INJECTION INTRAMUSCULAR; INTRAVENOUS
Status: DISPENSED
Start: 2020-02-26

## (undated) RX ORDER — PROPOFOL 10 MG/ML
INJECTION, EMULSION INTRAVENOUS
Status: DISPENSED
Start: 2020-02-26

## (undated) RX ORDER — FENTANYL CITRATE 50 UG/ML
INJECTION, SOLUTION INTRAMUSCULAR; INTRAVENOUS
Status: DISPENSED
Start: 2020-01-07

## (undated) RX ORDER — IPRATROPIUM BROMIDE AND ALBUTEROL SULFATE 2.5; .5 MG/3ML; MG/3ML
SOLUTION RESPIRATORY (INHALATION)
Status: DISPENSED
Start: 2020-01-07

## (undated) RX ORDER — PROPOFOL 10 MG/ML
INJECTION, EMULSION INTRAVENOUS
Status: DISPENSED
Start: 2020-01-07